# Patient Record
Sex: FEMALE | NOT HISPANIC OR LATINO | Employment: OTHER | URBAN - METROPOLITAN AREA
[De-identification: names, ages, dates, MRNs, and addresses within clinical notes are randomized per-mention and may not be internally consistent; named-entity substitution may affect disease eponyms.]

---

## 2017-11-30 ENCOUNTER — THERAPY VISIT (OUTPATIENT)
Dept: PHYSICAL THERAPY | Facility: CLINIC | Age: 62
End: 2017-11-30
Payer: COMMERCIAL

## 2017-11-30 DIAGNOSIS — M62.89 HIGH-TONE PELVIC FLOOR DYSFUNCTION: ICD-10-CM

## 2017-11-30 DIAGNOSIS — N39.3 URINARY, INCONTINENCE, STRESS FEMALE: Primary | ICD-10-CM

## 2017-11-30 PROCEDURE — 97140 MANUAL THERAPY 1/> REGIONS: CPT | Mod: GP | Performed by: PHYSICAL THERAPIST

## 2017-11-30 PROCEDURE — 97161 PT EVAL LOW COMPLEX 20 MIN: CPT | Mod: GP | Performed by: PHYSICAL THERAPIST

## 2017-11-30 PROCEDURE — 97530 THERAPEUTIC ACTIVITIES: CPT | Mod: GP | Performed by: PHYSICAL THERAPIST

## 2017-11-30 PROCEDURE — 97112 NEUROMUSCULAR REEDUCATION: CPT | Mod: GP | Performed by: PHYSICAL THERAPIST

## 2017-11-30 NOTE — LETTER
[facility]  [address]  [place] [address]  [phone]    2017    Re: Antoinette Collazo   :   1955  MRN:  [id number]   REFERRING PHYSICIAN:   Sarah Simpson    [facility]  Date of Initial Evaluation:  2017  Visits:  1 Rxs Used: 1  Reason for Referral:     Urinary, incontinence, stress female  High-tone pelvic floor dysfunction  EVALUATION SUMMARY  Subjective:  [facility] Initial Evaluation  History of current episode:  Onset date/MD order: .11/10/2017 Patient saw MD for orders for physical therapy     CC/Present symptoms: Pt presents to [facility] with c/c of urinary stress incontinence.  Patient states she has had incontinence for many years now and would like to abolish her symptoms.   Pain rating (0-10): 7/10  Pelvic floor pain with TP work  Conditioning is improving/unchanging/worsening: unchanging    Pelvic/Abdominal Surgeries: Pt has undergone a hysterectomy  and multiple abdominal laproscopies.  Pt also reports she had 2 tubal pregancies  Sexually active:NO  Hx of or present sexually transmitted disease:NO   Current occupation: none  Current activity: none  Goals: Abolish Incontinenc  Red flags: Patient is partially deaf  Urination:  Do you leak on the way to the bathroom or with a strong urge to void? Yes   Do you leak with cough,sneeze, jumping, running?Yes   Any other activities that cause leaking? No   Do you have triggers that make you feel you can't wait to go to the bathroom? No   Type of pad and number used per day? 1  When you leak what is the amount? Small  How long can you delay the need to urinate? 31 - 60 minutes.   Do you feel excessive pressure in pelvic floor No.    Frequency of daytime urination:every couple of hours  Frequency of nighttime urination:1  Can you stop the flow of urine when on the toilet? No  Is the volume of urine  "passed usually: medium. (8sec rule= 250ml with average bladder storing 400-600ml)  Do you strain to pass urine? No  Do you have a slow or hesitant urinary stream? No  Do you have difficulty initiating the urine stream? No  Is urination painful? No    Re: Antoinette Collazo   :   1955    How many bladder infections have you had in last 12 months? 1  Fluid intake(one glass is 8oz or one cup)  2-3 glasses/day, 4-6 glasses of Marcellus Ballard Ice tea caffinated glasses/day  0-1  alcohol glasses/day.  Bowel habits:  Frequency of bowel movements? 1 time a day  Consistancy of stool? Hard to diarrhea  Do you ignore the urge to defecate? No  Do you strain to pass stool? Yes  Pelvic Pain:  Do you have any pelvic pain with intercourse, exams, use of tampons? Yes  Are you sexually active?No  Have you ever been worried for your physical safety? No  Have you practiced the PF(kegel) exercises for 4 or more weeks? No  Treatment/Education provided this session (see flow sheet for additional information):  Self Care Management/Patient education (12 min): Today's session consisted of education regarding pelvic floor muscle anatomy, normal bladder function, urge suppression techniques and/or relaxation techniques as indicated, and instruction in how to complete a bladder diary for assessment next visit. Depending on patient presentation, timed voids, double voids, and proper fluid/fiber intake discussed. Pt was instructed in the pathoanatomy of the pelvic floor utilizing pelvic model.  We discussed what pelvic floor physical therapy is, components of exam, and typical patient progression. Prior to internal PFM exam,  patient was told that they were in control of exam progression, and if at any time were uncomfortable and wished to discontinue we would.    NMR (12 min): Patient instruction in correct isolation of PFM/TrA then coordinated contraction of \"canister\" muscles: diaphragm, Transverse Abdominals, PFM, and posterior spine " musculature.  Educated in initiating contraction from anal sphinctor, elevating through PFM, contraction of TrA, while exhaling slowly.  Cued for maximal and sub-maximal contractions, using hip rotators and adductors for overflow if needed.  Pt required cuing for each progression, with decreasing feedback as tolerated.  Instructed to avoid Valsalva/increased inter-abdominal pressure.  Pt cued through verbal, tactile (internal and external), and visual cuing utilizing biofeedback.  For HEP, pt encouraged to separate each phase of the exercise, then work towards coordination of the phases together with good breath technique with goal of developing good neuromuscular control of area.NMR pelvic pain (12 min):  Pt education regarding contributing factors to pelvic pain and dysfunction related to overactivity in the pelvic floor.  Included resources for relaxed awareness and pelvic floor quieting techniqes.  Extra time spent describing pelvic floor muscle exam and treatment plan/goals, with attention to potential history that may contribute to current symptoms.  Included resources for home release techniques using dilators and/or thera wand as indicated.  Recommended partner involvement if available.  Discussed in detail potential physiological and behavioral components of pelvic pain.  Demonstrated/performed techniques for input to painful area while using visualization and relaxation.    Pelvic Dysfunction Evaluation:    Bladder/Pelvic Problems:  Patient presents with USI symptoms.  Patient also reports being unable to stop passing gas if she has the need.  Patient reports that she feels bloated often and her belly is painful to touch.  Patient states that she has to strain to have a BM.  Storage Problem:  Stress incontinence and fecal incontinence  Emptying Problem:  Dyspareunia  Dyspareunia:  Grade 2    Re: Antoinette Collazo   :   1955    Flexibility:    Tightness present at:Iliopsoas; Hamstrings;  Piriformis and Gluteals  Internal vaginal assessment reveals tightness and MFR of levator ani, PC, puborectalis, and obturator internus muscles  Abdominal Wall:    Trigger Points:  Iliopsoas  Scar Mobility:  Abdominal scar tissue tightness noted of hysterectomy and laproscopy  Pelvic Clock Exam:    Ischiocavernosis pain:  +  Bulbocavernosis pain:  +  Transverse Perineal:  +  Levator ANI:  +++  Perineal Body:  ++  SI Provocation:  Si provocation pelvic: Dysfunction of LS and sacroiliac joint noted.  Reflex Testing:  NA  External Assessment:    Scars:  Well healed and tender  Tissue Symmetry:  Normal  Introitus:  Normal  Muscle Contraction/Perineal Mobility:  No movement  Internal Assessment:    Sensory Exam:  Abnormal for posterior and abnormal for anterior  Contraction/Grade:  Fllicker muscles stretched (1)  SEMG Biofeedback:  Semg biofeedback pelvic: Did not complete EMG evaluation because patient's resting level was 7uV and she was unable to lower with relaxation, change of positions or legs supported.  Did not want to increase pelvic tone anymore with muscle firing.  Suraface electrode placement--Perianal:  Surface perianal  Baseline EMG PM:  7.4uV  EMG interpretation to fatigue:  3-5 seconds  Position:  SupineAdditional History:  Delivery History:  Vaginal delivery  Number of Pregnancies: 3  Number of Live Births: 1  2 Tubal pregnancies  Caffeine Consumption:  4-6 ice tea     Assessment/Plan:    Patient is a 62 year old female with lumbar, sacral and pelvic complaints.    Patient has the following significant findings with corresponding treatment plan.                Diagnosis 1:  Urinary stress Incontinence  Diagnosis 2:  Pelvic floor hypertonicity  Therapy Evaluation Codes:   1) History comprised of:   Personal factors that impact the plan of care:      None.    Comorbidity factors that impact the plan of care are:      Bowel/bladder changes.     Medications impacting care: None.  2) Examination of Body  Systems comprised of:   Body structures and functions that impact the plan of care:      Lumbar spine, Pelvis and Sacral illiac joint.  Re: Antoinette Collazo   :   1955     Activity limitations that impact the plan of care are:      Jumping, Lifting, Fecal incontinence, Pelvic Exam and Stress incontinence.  3) Clinical presentation characteristics are:   Stable/Uncomplicated.  4) Decision-Making    Low complexity using standardized patient assessment instrument and/or measureable assessment of functional outcome.  Cumulative Therapy Evaluation is: Low complexity.  Previous and current functional limitations:  (See Goal Flow Sheet for this information)    Short term and Long term goals: (See Goal Flow Sheet for this information)   Communication ability:  Patient appears to be able to clearly communicate and understand verbal and written communication and follow directions correctly.  Treatment Explanation - The following has been discussed with the patient:   RX ordered/plan of care  Anticipated outcomes  Possible risks and side effects  This patient would benefit from PT intervention to resume normal activities.   Rehab potential is good.  Frequency:  1 X week, once daily  Duration:  for 8 weeks  Discharge Plan:  Achieve all LTG.  Independent in home treatment program.  Reach maximal therapeutic benefit.    Thank you for your referral.    INQUIRIES  Therapist: Tawanna Escobar    [facility]  [address] [place] [address] [address] [place] [id number]  Phone: [phone]  Fax: [phone]

## 2017-11-30 NOTE — MR AVS SNAPSHOT
"              After Visit Summary   11/30/2017    Antoinette Collazo    MRN: [id number]           Patient Information     Date Of Birth          1955        Visit Information        Provider Department      11/30/2017 11:55 AM Tawanna Escobar PT [place] [facility]        Today's Diagnoses     Urinary, incontinence, stress female    -  1    High-tone pelvic floor dysfunction           Follow-ups after your visit        Your next 10 appointments already scheduled     Dec 07, 2017 12:35 PM [facility]   [facility] with Tawanna Escobar PT   [place] [facility] ([facility]  )    [address]  [place] [address] [phone]              Who to contact     If you have questions or need follow up information about today's clinic visit or your schedule please contact [place] [facility]AP[facility] directly at [phone].  Normal or non-critical lab and imaging results will be communicated to you by Ovonyxhart, letter or phone within 4 business days after the clinic has received the results. If you do not hear from us within 7 days, please contact the clinic through Bitiumt or phone. If you have a critical or abnormal lab result, we will notify you by phone as soon as possible.  Submit refill requests through LaserLeap or call your pharmacy and they will forward the refill request to us. Please allow 3 business days for your refill to be completed.          Additional Information About Your Visit        [facility] Medical Information     LaserLeap lets you send messages to your doctor, view your test results, renew your prescriptions, schedule appointments and more. To sign up, go to [email or web address] . Click on \"Log in\" on the left side of the screen, which will take you to the Welcome page. Then click on \"Sign up Now\" on the right side of the page.     You will be asked to enter the access code " listed below, as well as some personal information. Please follow the directions to create your username and password.     Your access code is: [email or web address]  Expires: 2018  2:32 PM     Your access code will  in 90 days. If you need help or a new code, please call your [facility] clinic or [phone].        Care EveryWhere ID     This is your Care EveryWhere ID. This could be used by other organizations to access your [facility] medical records  [id number]         Blood Pressure from Last 3 Encounters:   09 138/64    Weight from Last 3 Encounters:   09 56.7 kg (125 lb)              We Performed the Following     HC PT EVAL, LOW COMPLEXITY     MANUAL THER TECH,1+REGIONS,EA 15 MIN     NEUROMUSCULAR RE-EDUCATION     THERAPEUTIC ACTIVITIES        Primary Care Provider Office Phone # Fax #    Zach Oliver [facility] [phone] [address] [address]        Equal Access to Services     [facility] : Hadii aad ku hadasho Soomaali, waaxda luqadaha, qaybta kaalmada adeegyada, waxay idiin hayaan adeeg lala lomeli . So [facility] [phone].    ATENCIÓN: Si habla español, tiene a modi disposición servicios gratuitos de asistencia lingüística. Llame al [phone].    We comply with applicable federal civil rights laws and [place] laws. We do not discriminate on the basis of race, color, national origin, age, disability, sex, sexual orientation, or gender identity.            Thank you!     Thank you for choosing [facility] PHYSICAL THERAP[facility]  for your care. Our goal is always to provide you with excellent care. Hearing back from our patients is one way we can continue to improve our services. Please take a few minutes to complete the written survey that you may receive in the mail after your visit with us. Thank you!             Your Updated Medication List - Protect others around you: Learn how to safely use, store and throw away your  medicines at www.disposemymeds.org.      Notice  As of 11/30/2017  2:32 PM    You have not been prescribed any medications.

## 2017-11-30 NOTE — PROGRESS NOTES
Subjective:  [place] [facility] Initial Evaluation    History of current episode:    Onset date/MD order: .11/10/2017 Patient saw MD for orders for physical therapy     CC/Present symptoms: Pt presents to [facility] with c/c of urinary stress incontinence.  Patient states she has had incontinence for many years now and would like to abolish her symptoms.   Pain rating (0-10): 7/10  Pelvic floor pain with TP work  Conditioning is improving/unchanging/worsening: unchanging    Pelvic/Abdominal Surgeries: Pt has undergone a hysterectomy 2002 and multiple abdominal laproscopies.  Pt also reports she had 2 tubal pregancies  Sexually active:NO  Hx of or present sexually transmitted disease:NO   Current occupation: none  Current activity: none  Goals: Abolish Incontinenc  Red flags: Patient is partially deaf      Urination:  Do you leak on the way to the bathroom or with a strong urge to void? Yes   Do you leak with cough,sneeze, jumping, running?Yes   Any other activities that cause leaking? No   Do you have triggers that make you feel you can't wait to go to the bathroom? No   Type of pad and number used per day? 1  When you leak what is the amount? Small  How long can you delay the need to urinate? 31 - 60 minutes.   Do you feel excessive pressure in pelvic floor No.    Frequency of daytime urination:every couple of hours  Frequency of nighttime urination:1  Can you stop the flow of urine when on the toilet? No  Is the volume of urine passed usually: medium. (8sec rule= 250ml with average bladder storing 400-600ml)  Do you strain to pass urine? No  Do you have a slow or hesitant urinary stream? No  Do you have difficulty initiating the urine stream? No  Is urination painful? No  How many bladder infections have you had in last 12 months? 1  Fluid intake(one glass is 8oz or one cup)  2-3 glasses/day, 4-6 glasses of Marcellus Ballard Ice tea caffinated glasses/day  0-1  alcohol glasses/day.  Bowel habits:  Frequency of  "bowel movements? 1 time a day  Consistancy of stool? Hard to diarrhea  Do you ignore the urge to defecate? No  Do you strain to pass stool? Yes  Pelvic Pain:  Do you have any pelvic pain with intercourse, exams, use of tampons? Yes  Are you sexually active?No  Have you ever been worried for your physical safety? No  Have you practiced the PF(kegel) exercises for 4 or more weeks? No    Treatment/Education provided this session (see flow sheet for additional information):    Self Care Management/Patient education (12 min): Today's session consisted of education regarding pelvic floor muscle anatomy, normal bladder function, urge suppression techniques and/or relaxation techniques as indicated, and instruction in how to complete a bladder diary for assessment next visit. Depending on patient presentation, timed voids, double voids, and proper fluid/fiber intake discussed. Pt was instructed in the pathoanatomy of the pelvic floor utilizing pelvic model.  We discussed what pelvic floor physical therapy is, components of exam, and typical patient progression. Prior to internal PFM exam,  patient was told that they were in control of exam progression, and if at any time were uncomfortable and wished to discontinue we would.        NMR (12 min): Patient instruction in correct isolation of PFM/TrA then coordinated contraction of \"canister\" muscles: diaphragm, Transverse Abdominals, PFM, and posterior spine musculature.  Educated in initiating contraction from anal sphinctor, elevating through PFM, contraction of TrA, while exhaling slowly.  Cued for maximal and sub-maximal contractions, using hip rotators and adductors for overflow if needed.  Pt required cuing for each progression, with decreasing feedback as tolerated.  Instructed to avoid Valsalva/increased inter-abdominal pressure.  Pt cued through verbal, tactile (internal and external), and visual cuing utilizing biofeedback.  For HEP, pt encouraged to separate each " phase of the exercise, then work towards coordination of the phases together with good breath technique with goal of developing good neuromuscular control of area.        NMR pelvic pain (12 min):  Pt education regarding contributing factors to pelvic pain and dysfunction related to overactivity in the pelvic floor.  Included resources for relaxed awareness and pelvic floor quieting techniqes.  Extra time spent describing pelvic floor muscle exam and treatment plan/goals, with attention to potential history that may contribute to current symptoms.  Included resources for home release techniques using dilators and/or thera wand as indicated.  Recommended partner involvement if available.  Discussed in detail potential physiological and behavioral components of pelvic pain.  Demonstrated/performed techniques for input to painful area while using visualization and relaxation.                  HPI                    Objective:    System                                 Pelvic Dysfunction Evaluation:    Bladder/Pelvic Problems:  Patient presents with USI symptoms.  Patient also reports being unable to stop passing gas if she has the need.  Patient reports that she feels bloated often and her belly is painful to touch.  Patient states that she has to strain to have a BM.  Storage Problem:  Stress incontinence and fecal incontinence  Emptying Problem:  Dyspareunia  Dyspareunia:  Grade 2      Flexibility:    Tightness present at:Iliopsoas; Hamstrings; Piriformis and Gluteals  Internal vaginal assessment reveals tightness and MFR of levator ani, PC, puborectalis, and obturator internus muscles  Abdominal Wall:      Trigger Points:  Iliopsoas  Scar Mobility:  Abdominal scar tissue tightness noted of hysterectomy and laproscopy  Pelvic Clock Exam:    Ischiocavernosis pain:  +  Bulbocavernosis pain:  +  Transverse Perineal:  +  Levator ANI:  +++  Perineal Body:  ++  SI Provocation:  Si provocation pelvic: Dysfunction of LS and  sacroiliac joint noted.        Reflex Testing:  NA    External Assessment:      Scars:  Well healed and tender    Tissue Symmetry:  Normal  Introitus:  Normal  Muscle Contraction/Perineal Mobility:  No movement  Internal Assessment:    Sensory Exam:  Abnormal for posterior and abnormal for anterior  Contraction/Grade:  Fllicker muscles stretched (1)          SEMG Biofeedback:  Semg biofeedback pelvic: Did not complete EMG evaluation because patient's resting level was 7uV and she was unable to lower with relaxation, change of positions or legs supported.  Did not want to increase pelvic tone anymore with muscle firing.      Suraface electrode placement--Perianal:  Surface perianal  Baseline EMG PM:  7.4uV        EMG interpretation to fatigue:  3-5 seconds  Position:  SupineAdditional History:  Delivery History:  Vaginal delivery  Number of Pregnancies: 3  Number of Live Births: 1  2 Tubal pregnancies  Caffeine Consumption:  4-6 ice tea                     General     ROS    Assessment/Plan:      Patient is a 62 year old female with lumbar, sacral and pelvic complaints.    Patient has the following significant findings with corresponding treatment plan.                Diagnosis 1:  Urinary stress Incontinence  Diagnosis 2:  Pelvic floor hypertonicity    Therapy Evaluation Codes:   1) History comprised of:   Personal factors that impact the plan of care:      None.    Comorbidity factors that impact the plan of care are:      Bowel/bladder changes.     Medications impacting care: None.  2) Examination of Body Systems comprised of:   Body structures and functions that impact the plan of care:      Lumbar spine, Pelvis and Sacral illiac joint.   Activity limitations that impact the plan of care are:      Jumping, Lifting, Fecal incontinence, Pelvic Exam and Stress incontinence.  3) Clinical presentation characteristics are:   Stable/Uncomplicated.  4) Decision-Making    Low complexity using standardized patient  assessment instrument and/or measureable assessment of functional outcome.  Cumulative Therapy Evaluation is: Low complexity.    Previous and current functional limitations:  (See Goal Flow Sheet for this information)    Short term and Long term goals: (See Goal Flow Sheet for this information)     Communication ability:  Patient appears to be able to clearly communicate and understand verbal and written communication and follow directions correctly.  Treatment Explanation - The following has been discussed with the patient:   RX ordered/plan of care  Anticipated outcomes  Possible risks and side effects  This patient would benefit from PT intervention to resume normal activities.   Rehab potential is good.    Frequency:  1 X week, once daily  Duration:  for 8 weeks  Discharge Plan:  Achieve all LTG.  Independent in home treatment program.  Reach maximal therapeutic benefit.    Please refer to the daily flowsheet for treatment today, total treatment time and time spent performing 1:1 timed codes.

## 2017-11-30 NOTE — PROGRESS NOTES
Subjective:    Patient is a 62 year old female presenting with rehab left ankle/foot hpi.                                      Pertinent medical history includes:  High blood pressure.    Other surgeries include:  None reported.  Current medications:  None as reported by patient.  Current occupation is none.        Barriers include:  None as reported by patient.    Red flags:  None as reported by patient.                        Objective:    System    Physical Exam    General     ROS    Assessment/Plan:

## 2017-12-07 ENCOUNTER — THERAPY VISIT (OUTPATIENT)
Dept: PHYSICAL THERAPY | Facility: CLINIC | Age: 62
End: 2017-12-07
Payer: COMMERCIAL

## 2017-12-07 DIAGNOSIS — N94.10 DYSPAREUNIA, FEMALE: ICD-10-CM

## 2017-12-07 DIAGNOSIS — N39.46 MIXED STRESS AND URGE URINARY INCONTINENCE: Primary | ICD-10-CM

## 2017-12-07 PROCEDURE — 97112 NEUROMUSCULAR REEDUCATION: CPT | Mod: GP | Performed by: PHYSICAL THERAPIST

## 2017-12-07 PROCEDURE — 97530 THERAPEUTIC ACTIVITIES: CPT | Mod: GP | Performed by: PHYSICAL THERAPIST

## 2017-12-07 PROCEDURE — 97140 MANUAL THERAPY 1/> REGIONS: CPT | Mod: GP | Performed by: PHYSICAL THERAPIST

## 2017-12-14 ENCOUNTER — THERAPY VISIT (OUTPATIENT)
Dept: PHYSICAL THERAPY | Facility: CLINIC | Age: 62
End: 2017-12-14
Payer: COMMERCIAL

## 2017-12-14 DIAGNOSIS — N39.46 MIXED STRESS AND URGE URINARY INCONTINENCE: Primary | ICD-10-CM

## 2017-12-14 DIAGNOSIS — N94.10 DYSPAREUNIA, FEMALE: ICD-10-CM

## 2017-12-14 PROCEDURE — 97530 THERAPEUTIC ACTIVITIES: CPT | Mod: GP | Performed by: PHYSICAL THERAPIST

## 2017-12-14 PROCEDURE — 97140 MANUAL THERAPY 1/> REGIONS: CPT | Mod: GP | Performed by: PHYSICAL THERAPIST

## 2017-12-21 ENCOUNTER — THERAPY VISIT (OUTPATIENT)
Dept: PHYSICAL THERAPY | Facility: CLINIC | Age: 62
End: 2017-12-21
Payer: COMMERCIAL

## 2017-12-21 DIAGNOSIS — N39.46 MIXED STRESS AND URGE URINARY INCONTINENCE: Primary | ICD-10-CM

## 2017-12-21 DIAGNOSIS — N94.10 DYSPAREUNIA, FEMALE: ICD-10-CM

## 2017-12-21 PROCEDURE — 97530 THERAPEUTIC ACTIVITIES: CPT | Mod: GP | Performed by: PHYSICAL THERAPIST

## 2017-12-21 PROCEDURE — 97112 NEUROMUSCULAR REEDUCATION: CPT | Mod: GP | Performed by: PHYSICAL THERAPIST

## 2017-12-21 PROCEDURE — 97140 MANUAL THERAPY 1/> REGIONS: CPT | Mod: GP | Performed by: PHYSICAL THERAPIST

## 2018-01-04 ENCOUNTER — THERAPY VISIT (OUTPATIENT)
Dept: PHYSICAL THERAPY | Facility: CLINIC | Age: 63
End: 2018-01-04
Payer: COMMERCIAL

## 2018-01-04 DIAGNOSIS — N94.10 DYSPAREUNIA, FEMALE: ICD-10-CM

## 2018-01-04 DIAGNOSIS — N39.46 MIXED STRESS AND URGE URINARY INCONTINENCE: Primary | ICD-10-CM

## 2018-01-04 PROCEDURE — 97140 MANUAL THERAPY 1/> REGIONS: CPT | Mod: GP | Performed by: PHYSICAL THERAPIST

## 2018-01-04 PROCEDURE — 97112 NEUROMUSCULAR REEDUCATION: CPT | Mod: GP | Performed by: PHYSICAL THERAPIST

## 2018-01-04 PROCEDURE — 97530 THERAPEUTIC ACTIVITIES: CPT | Mod: GP | Performed by: PHYSICAL THERAPIST

## 2018-01-18 ENCOUNTER — THERAPY VISIT (OUTPATIENT)
Dept: PHYSICAL THERAPY | Facility: CLINIC | Age: 63
End: 2018-01-18
Payer: COMMERCIAL

## 2018-01-18 DIAGNOSIS — N94.10 DYSPAREUNIA, FEMALE: ICD-10-CM

## 2018-01-18 DIAGNOSIS — N39.46 MIXED STRESS AND URGE URINARY INCONTINENCE: Primary | ICD-10-CM

## 2018-01-18 PROCEDURE — 97112 NEUROMUSCULAR REEDUCATION: CPT | Mod: GP | Performed by: PHYSICAL THERAPIST

## 2018-01-18 PROCEDURE — 97140 MANUAL THERAPY 1/> REGIONS: CPT | Mod: GP | Performed by: PHYSICAL THERAPIST

## 2018-01-18 PROCEDURE — 97530 THERAPEUTIC ACTIVITIES: CPT | Mod: GP | Performed by: PHYSICAL THERAPIST

## 2018-02-08 ENCOUNTER — THERAPY VISIT (OUTPATIENT)
Dept: PHYSICAL THERAPY | Facility: CLINIC | Age: 63
End: 2018-02-08
Payer: COMMERCIAL

## 2018-02-08 DIAGNOSIS — N39.46 MIXED STRESS AND URGE URINARY INCONTINENCE: ICD-10-CM

## 2018-02-08 DIAGNOSIS — N94.10 DYSPAREUNIA, FEMALE: Primary | ICD-10-CM

## 2018-02-08 PROCEDURE — 97530 THERAPEUTIC ACTIVITIES: CPT | Mod: GP | Performed by: PHYSICAL THERAPIST

## 2018-02-08 PROCEDURE — 97112 NEUROMUSCULAR REEDUCATION: CPT | Mod: GP | Performed by: PHYSICAL THERAPIST

## 2018-02-08 PROCEDURE — 97140 MANUAL THERAPY 1/> REGIONS: CPT | Mod: GP | Performed by: PHYSICAL THERAPIST

## 2018-03-01 ENCOUNTER — THERAPY VISIT (OUTPATIENT)
Dept: PHYSICAL THERAPY | Facility: CLINIC | Age: 63
End: 2018-03-01
Payer: COMMERCIAL

## 2018-03-01 DIAGNOSIS — N94.10 DYSPAREUNIA, FEMALE: ICD-10-CM

## 2018-03-01 DIAGNOSIS — N39.46 MIXED STRESS AND URGE URINARY INCONTINENCE: Primary | ICD-10-CM

## 2018-03-01 PROCEDURE — 97112 NEUROMUSCULAR REEDUCATION: CPT | Mod: GP | Performed by: PHYSICAL THERAPIST

## 2018-03-01 PROCEDURE — 97140 MANUAL THERAPY 1/> REGIONS: CPT | Mod: GP | Performed by: PHYSICAL THERAPIST

## 2018-03-01 PROCEDURE — 97530 THERAPEUTIC ACTIVITIES: CPT | Mod: GP | Performed by: PHYSICAL THERAPIST

## 2018-03-29 ENCOUNTER — THERAPY VISIT (OUTPATIENT)
Dept: PHYSICAL THERAPY | Facility: CLINIC | Age: 63
End: 2018-03-29
Payer: COMMERCIAL

## 2018-03-29 DIAGNOSIS — N94.10 DYSPAREUNIA, FEMALE: ICD-10-CM

## 2018-03-29 DIAGNOSIS — N39.46 MIXED STRESS AND URGE URINARY INCONTINENCE: Primary | ICD-10-CM

## 2018-03-29 PROCEDURE — 97140 MANUAL THERAPY 1/> REGIONS: CPT | Mod: GP | Performed by: PHYSICAL THERAPIST

## 2018-03-29 PROCEDURE — 97112 NEUROMUSCULAR REEDUCATION: CPT | Mod: GP | Performed by: PHYSICAL THERAPIST

## 2018-03-29 PROCEDURE — 97530 THERAPEUTIC ACTIVITIES: CPT | Mod: GP | Performed by: PHYSICAL THERAPIST

## 2018-04-05 ENCOUNTER — THERAPY VISIT (OUTPATIENT)
Dept: PHYSICAL THERAPY | Facility: CLINIC | Age: 63
End: 2018-04-05
Payer: COMMERCIAL

## 2018-04-05 DIAGNOSIS — N39.46 MIXED STRESS AND URGE URINARY INCONTINENCE: Primary | ICD-10-CM

## 2018-04-05 DIAGNOSIS — N94.10 DYSPAREUNIA, FEMALE: ICD-10-CM

## 2018-04-05 PROCEDURE — 97110 THERAPEUTIC EXERCISES: CPT | Mod: GP | Performed by: PHYSICAL THERAPIST

## 2018-04-05 PROCEDURE — 97140 MANUAL THERAPY 1/> REGIONS: CPT | Mod: GP | Performed by: PHYSICAL THERAPIST

## 2018-04-05 PROCEDURE — 97112 NEUROMUSCULAR REEDUCATION: CPT | Mod: GP | Performed by: PHYSICAL THERAPIST

## 2018-04-19 ENCOUNTER — THERAPY VISIT (OUTPATIENT)
Dept: PHYSICAL THERAPY | Facility: CLINIC | Age: 63
End: 2018-04-19
Payer: COMMERCIAL

## 2018-04-19 DIAGNOSIS — N39.46 MIXED STRESS AND URGE URINARY INCONTINENCE: Primary | ICD-10-CM

## 2018-04-19 DIAGNOSIS — N94.10 DYSPAREUNIA, FEMALE: ICD-10-CM

## 2018-04-19 PROCEDURE — 97110 THERAPEUTIC EXERCISES: CPT | Mod: GP | Performed by: PHYSICAL THERAPIST

## 2018-04-19 PROCEDURE — 97112 NEUROMUSCULAR REEDUCATION: CPT | Mod: GP | Performed by: PHYSICAL THERAPIST

## 2018-04-19 PROCEDURE — 97140 MANUAL THERAPY 1/> REGIONS: CPT | Mod: GP | Performed by: PHYSICAL THERAPIST

## 2018-07-24 ENCOUNTER — HOSPITAL ENCOUNTER (OUTPATIENT)
Dept: MAMMOGRAPHY | Facility: CLINIC | Age: 63
Discharge: HOME | End: 2018-07-24
Attending: FAMILY MEDICINE | Admitting: FAMILY MEDICINE
Payer: COMMERCIAL

## 2018-07-24 DIAGNOSIS — Z12.31 VISIT FOR SCREENING MAMMOGRAM: ICD-10-CM

## 2018-07-24 PROCEDURE — 77067 SCR MAMMO BI INCL CAD: CPT

## 2019-12-28 ENCOUNTER — APPOINTMENT (OUTPATIENT)
Dept: GENERAL RADIOLOGY | Facility: CLINIC | Age: 64
End: 2019-12-28
Attending: EMERGENCY MEDICINE
Payer: COMMERCIAL

## 2019-12-28 ENCOUNTER — HOSPITAL ENCOUNTER (INPATIENT)
Facility: CLINIC | Age: 64
LOS: 5 days | Discharge: HOME HEALTH | End: 2020-01-02
Attending: EMERGENCY MEDICINE | Admitting: HOSPITALIST
Payer: COMMERCIAL

## 2019-12-28 DIAGNOSIS — S72.001A HIP FRACTURE, RIGHT, CLOSED, INITIAL ENCOUNTER (H): ICD-10-CM

## 2019-12-28 LAB
ANION GAP SERPL CALCULATED.3IONS-SCNC: 4 MMOL/L (ref 3–14)
BASOPHILS # BLD AUTO: 0 10E9/L (ref 0–0.2)
BASOPHILS NFR BLD AUTO: 0.3 %
BUN SERPL-MCNC: 11 MG/DL (ref 7–30)
CALCIUM SERPL-MCNC: 9 MG/DL (ref 8.5–10.1)
CHLORIDE SERPL-SCNC: 107 MMOL/L (ref 94–109)
CO2 SERPL-SCNC: 30 MMOL/L (ref 20–32)
CREAT SERPL-MCNC: 0.64 MG/DL (ref 0.52–1.04)
DIFFERENTIAL METHOD BLD: NORMAL
EOSINOPHIL # BLD AUTO: 0.1 10E9/L (ref 0–0.7)
EOSINOPHIL NFR BLD AUTO: 0.5 %
ERYTHROCYTE [DISTWIDTH] IN BLOOD BY AUTOMATED COUNT: 12.7 % (ref 10–15)
GFR SERPL CREATININE-BSD FRML MDRD: >90 ML/MIN/{1.73_M2}
GLUCOSE SERPL-MCNC: 124 MG/DL (ref 70–99)
HCT VFR BLD AUTO: 42.3 % (ref 35–47)
HGB BLD-MCNC: 14.2 G/DL (ref 11.7–15.7)
IMM GRANULOCYTES # BLD: 0 10E9/L (ref 0–0.4)
IMM GRANULOCYTES NFR BLD: 0.4 %
LYMPHOCYTES # BLD AUTO: 2.5 10E9/L (ref 0.8–5.3)
LYMPHOCYTES NFR BLD AUTO: 22.7 %
MCH RBC QN AUTO: 30.9 PG (ref 26.5–33)
MCHC RBC AUTO-ENTMCNC: 33.6 G/DL (ref 31.5–36.5)
MCV RBC AUTO: 92 FL (ref 78–100)
MONOCYTES # BLD AUTO: 0.6 10E9/L (ref 0–1.3)
MONOCYTES NFR BLD AUTO: 5.8 %
NEUTROPHILS # BLD AUTO: 7.7 10E9/L (ref 1.6–8.3)
NEUTROPHILS NFR BLD AUTO: 70.3 %
NRBC # BLD AUTO: 0 10*3/UL
NRBC BLD AUTO-RTO: 0 /100
PLATELET # BLD AUTO: 293 10E9/L (ref 150–450)
POTASSIUM SERPL-SCNC: 3.8 MMOL/L (ref 3.4–5.3)
RBC # BLD AUTO: 4.59 10E12/L (ref 3.8–5.2)
SODIUM SERPL-SCNC: 141 MMOL/L (ref 133–144)
WBC # BLD AUTO: 10.9 10E9/L (ref 4–11)

## 2019-12-28 PROCEDURE — 25000128 H RX IP 250 OP 636: Performed by: EMERGENCY MEDICINE

## 2019-12-28 PROCEDURE — 96376 TX/PRO/DX INJ SAME DRUG ADON: CPT

## 2019-12-28 PROCEDURE — 96374 THER/PROPH/DIAG INJ IV PUSH: CPT

## 2019-12-28 PROCEDURE — 99222 1ST HOSP IP/OBS MODERATE 55: CPT | Mod: AI | Performed by: HOSPITALIST

## 2019-12-28 PROCEDURE — 73502 X-RAY EXAM HIP UNI 2-3 VIEWS: CPT

## 2019-12-28 PROCEDURE — 80048 BASIC METABOLIC PNL TOTAL CA: CPT | Performed by: EMERGENCY MEDICINE

## 2019-12-28 PROCEDURE — 25800030 ZZH RX IP 258 OP 636: Performed by: HOSPITALIST

## 2019-12-28 PROCEDURE — 99285 EMERGENCY DEPT VISIT HI MDM: CPT | Mod: 25

## 2019-12-28 PROCEDURE — 12000000 ZZH R&B MED SURG/OB

## 2019-12-28 PROCEDURE — 96361 HYDRATE IV INFUSION ADD-ON: CPT

## 2019-12-28 PROCEDURE — 85025 COMPLETE CBC W/AUTO DIFF WBC: CPT | Performed by: EMERGENCY MEDICINE

## 2019-12-28 PROCEDURE — 71045 X-RAY EXAM CHEST 1 VIEW: CPT

## 2019-12-28 PROCEDURE — 25000132 ZZH RX MED GY IP 250 OP 250 PS 637: Performed by: HOSPITALIST

## 2019-12-28 PROCEDURE — 25000128 H RX IP 250 OP 636: Performed by: HOSPITALIST

## 2019-12-28 PROCEDURE — 25800030 ZZH RX IP 258 OP 636: Performed by: EMERGENCY MEDICINE

## 2019-12-28 RX ORDER — AMOXICILLIN 250 MG
2 CAPSULE ORAL 2 TIMES DAILY PRN
Status: DISCONTINUED | OUTPATIENT
Start: 2019-12-28 | End: 2020-01-02 | Stop reason: HOSPADM

## 2019-12-28 RX ORDER — PROCHLORPERAZINE MALEATE 5 MG
10 TABLET ORAL EVERY 6 HOURS PRN
Status: DISCONTINUED | OUTPATIENT
Start: 2019-12-28 | End: 2020-01-02 | Stop reason: HOSPADM

## 2019-12-28 RX ORDER — PROCHLORPERAZINE 25 MG
25 SUPPOSITORY, RECTAL RECTAL EVERY 12 HOURS PRN
Status: DISCONTINUED | OUTPATIENT
Start: 2019-12-28 | End: 2020-01-02 | Stop reason: HOSPADM

## 2019-12-28 RX ORDER — BISACODYL 10 MG
10 SUPPOSITORY, RECTAL RECTAL DAILY PRN
Status: DISCONTINUED | OUTPATIENT
Start: 2019-12-28 | End: 2019-12-29

## 2019-12-28 RX ORDER — HYDROMORPHONE HYDROCHLORIDE 1 MG/ML
0.5 INJECTION, SOLUTION INTRAMUSCULAR; INTRAVENOUS; SUBCUTANEOUS
Status: COMPLETED | OUTPATIENT
Start: 2019-12-28 | End: 2019-12-28

## 2019-12-28 RX ORDER — HYDROMORPHONE HYDROCHLORIDE 1 MG/ML
.3-.5 INJECTION, SOLUTION INTRAMUSCULAR; INTRAVENOUS; SUBCUTANEOUS
Status: DISCONTINUED | OUTPATIENT
Start: 2019-12-28 | End: 2020-01-02 | Stop reason: HOSPADM

## 2019-12-28 RX ORDER — HYDROCODONE BITARTRATE AND ACETAMINOPHEN 5; 325 MG/1; MG/1
1-2 TABLET ORAL EVERY 4 HOURS PRN
Status: DISCONTINUED | OUTPATIENT
Start: 2019-12-28 | End: 2019-12-29 | Stop reason: ALTCHOICE

## 2019-12-28 RX ORDER — ONDANSETRON 4 MG/1
4 TABLET, ORALLY DISINTEGRATING ORAL EVERY 6 HOURS PRN
Status: DISCONTINUED | OUTPATIENT
Start: 2019-12-28 | End: 2019-12-29

## 2019-12-28 RX ORDER — AMOXICILLIN 250 MG
1 CAPSULE ORAL 2 TIMES DAILY PRN
Status: DISCONTINUED | OUTPATIENT
Start: 2019-12-28 | End: 2020-01-02 | Stop reason: HOSPADM

## 2019-12-28 RX ORDER — SODIUM CHLORIDE 9 MG/ML
1000 INJECTION, SOLUTION INTRAVENOUS CONTINUOUS
Status: DISCONTINUED | OUTPATIENT
Start: 2019-12-28 | End: 2019-12-28

## 2019-12-28 RX ORDER — NALOXONE HYDROCHLORIDE 0.4 MG/ML
.1-.4 INJECTION, SOLUTION INTRAMUSCULAR; INTRAVENOUS; SUBCUTANEOUS
Status: DISCONTINUED | OUTPATIENT
Start: 2019-12-28 | End: 2020-01-02 | Stop reason: HOSPADM

## 2019-12-28 RX ORDER — LATANOPROST 50 UG/ML
1 SOLUTION/ DROPS OPHTHALMIC DAILY
COMMUNITY

## 2019-12-28 RX ORDER — SODIUM CHLORIDE 9 MG/ML
INJECTION, SOLUTION INTRAVENOUS CONTINUOUS
Status: DISCONTINUED | OUTPATIENT
Start: 2019-12-28 | End: 2019-12-29

## 2019-12-28 RX ORDER — ONDANSETRON 2 MG/ML
4 INJECTION INTRAMUSCULAR; INTRAVENOUS EVERY 6 HOURS PRN
Status: DISCONTINUED | OUTPATIENT
Start: 2019-12-28 | End: 2019-12-29

## 2019-12-28 RX ORDER — LIDOCAINE 40 MG/G
CREAM TOPICAL
Status: DISCONTINUED | OUTPATIENT
Start: 2019-12-28 | End: 2019-12-29

## 2019-12-28 RX ORDER — ACETAMINOPHEN 325 MG/1
650 TABLET ORAL EVERY 4 HOURS PRN
Status: DISCONTINUED | OUTPATIENT
Start: 2019-12-28 | End: 2019-12-29

## 2019-12-28 RX ADMIN — HYDROMORPHONE HYDROCHLORIDE 0.5 MG: 1 INJECTION, SOLUTION INTRAMUSCULAR; INTRAVENOUS; SUBCUTANEOUS at 18:26

## 2019-12-28 RX ADMIN — HYDROMORPHONE HYDROCHLORIDE 0.5 MG: 1 INJECTION, SOLUTION INTRAMUSCULAR; INTRAVENOUS; SUBCUTANEOUS at 20:57

## 2019-12-28 RX ADMIN — ACETAMINOPHEN 650 MG: 325 TABLET, FILM COATED ORAL at 23:45

## 2019-12-28 RX ADMIN — ONDANSETRON 4 MG: 2 INJECTION INTRAMUSCULAR; INTRAVENOUS at 20:57

## 2019-12-28 RX ADMIN — SODIUM CHLORIDE 1000 ML: 9 INJECTION, SOLUTION INTRAVENOUS at 18:27

## 2019-12-28 RX ADMIN — SODIUM CHLORIDE: 9 INJECTION, SOLUTION INTRAVENOUS at 20:55

## 2019-12-28 RX ADMIN — HYDROMORPHONE HYDROCHLORIDE 0.5 MG: 1 INJECTION, SOLUTION INTRAMUSCULAR; INTRAVENOUS; SUBCUTANEOUS at 23:28

## 2019-12-28 RX ADMIN — HYDROMORPHONE HYDROCHLORIDE 0.5 MG: 1 INJECTION, SOLUTION INTRAMUSCULAR; INTRAVENOUS; SUBCUTANEOUS at 19:40

## 2019-12-28 NOTE — ED PROVIDER NOTES
History     Chief Complaint:  Fall    HPI   Antoinette Collazo is a 64 year old female who presents by EMS after a fall. The patient was out walking her dog today when she slipped on the ice and fell, landing on her right and causing immediate severe nonradiating right hip pain. She denied hitting her head, losing consciousness or walking after falling. EMS was called to bring the patient to the ED with concern for hip fracture. En route she was given 1.5 mg of Dilaudid and 4 mg of Zofran. The patient reported that at baseline she can ambulate on her own. Of note the patient stated that her good ear is her right ear due to chronic hearing impairment.    She is not on blood thinners.  She denies concern for any injuries besides her right hip.  She worries that she has broken her right hip.    Allergies:  Amoxicillin   Ibuprofen      Medications:    The patient is currently on no regular medications.     Past Medical History:    The patient denies any significant past medical history.    Past Surgical History:    Hysterectomy     Family History:    No past pertinent family history.    Social History:  Smoking Status: Not on file   Alcohol use: Not on file  Drug use: Not on file  Marital Status:   [2]     Review of Systems   All other systems reviewed and are negative.    Physical Exam     Patient Vitals for the past 24 hrs:   BP Temp Temp src Pulse Heart Rate Resp SpO2   12/28/19 1830 130/67 -- -- 69 -- -- 98 %   12/28/19 1817 -- -- -- -- -- -- 96 %   12/28/19 1815 134/72 -- -- 74 -- -- --   12/28/19 1748 -- -- -- -- -- -- 93 %   12/28/19 1745 (!) 87/56 98  F (36.7  C) Oral 81 72 18 93 %     Physical Exam  General: Uncomfortable appearing woman recumbent in room 13,  at bedside  HENT: MMM, no signs of facial trauma  CV:  regular rhythm, soft compartments, cap refill normal  Resp: normal effort, clear throughout  GI: abdomen soft,  nontender  MSK:  Spine: nontender  Extremities: Tenderness to right  hip with slight external rotation and shortening of the right leg compared to the left.  Greatly decreased range of motion of right hip due to pain.  No other tenderness of the extremities.  Skin:   No abrasion  No ecchymosis  No laceration  Neuro: awake, alert, GCS 15, responds appropriately to commands, SILT all extremities  Psych: cooperative    Emergency Department Course   Imaging:  Radiographic findings were communicated with the patient who voiced understanding of the findings.    XR Chest 1 views:   Normal chest. Clear lungs As per radiology.     XR Pelvis and Hip Right, 1 view:   1.  Acute, simple, transversely oriented fracture of the right femoral  neck, mildly displaced/angulated.  2.  Normal right hip joint alignment maintained. Maintained joint  space.  3.  The remainder of the pelvis and left hip are normal, as per radiology.     Laboratory:  CBC: WBC: 10.9, HGB: 14.2, PLT: 293  BMP: Glucose 124 (H), o/w WNL (Creatinine: 0.64)    Interventions:  1926 Dilaudid 0.5 mg IV   1827 NS 1L IV     Emergency Department Course:  Nursing notes and vitals reviewed. (1746) I performed an exam of the patient as documented above.     IV inserted. Medicine administered as documented above. Blood drawn. This was sent to the lab for further testing, results above.    The patient was sent for chest and hip XR while in the emergency department, findings above.     (1850) I rechecked the patient and discussed the results of her workup thus far.     (1857) I consulted with Dr. Brito, Orthopedics, regarding the patient's history and presentation here in the emergency department.  He will tentatively plan surgical intervention tomorrow, and requests admission to the hospitalist based on patient age, for pre-operative assessment.    (1902)  I consulted with Dr. Pisano of the hospitalist services. They are in agreement to accept the patient for admission.    Findings and plan explained to the Patient who consents to  admission. Discussed the patient with Dr. Pisano, who will admit the patient to a Ortho-inpatient bed for further monitoring, evaluation, and treatment.    Impression & Plan    Medical Decision Making:  Her mechanical fall has unfortunately led to a hip fracture as described above.  She will require surgical intervention during this hospitalization.  I spoke with the on-call orthopedist who will tentatively plan to perform this tomorrow.  She will be kept n.p.o. after midnight.  No other identified injuries, nor suspicion for such based on mechanism and lack of other symptoms or worrisome findings on exam.  Her initial hypotension I think is almost certainly due to a fairly significant dose of IV Dilaudid given just prior to arrival, as it improved quickly without aggressive intervention.  I do not think this is representative of serious internal hemorrhage or other worrisome traumatic injury nor that trauma surgery consultation is indicated emergently.  I note that her hemoglobin is satisfactory and her admission vital signs are normal.  Tertiary exam can be completed here in the hospital.  These findings and tentative plan were explained to the patient who was admitted to the hospital service for further multidisciplinary care.    Diagnosis:    ICD-10-CM    1. Hip fracture, right, closed, initial encounter (H) S72.001A CBC with platelets differential     Basic metabolic panel       Disposition:  Admitted to Dr. Pisano    This record was created at least in part using electronic voice recognition software, so please excuse any typographical errors.    Scribe Disclosure:  I, Daniella Brambila, am serving as a scribe on 12/28/2019 at 5:46 PM to personally document services performed by Pepe Sood, * based on my observations and the provider's statements to me.       Daniella Brambila  12/28/2019    EMERGENCY DEPARTMENT       Pepe Sood MD  12/28/19 8180

## 2019-12-28 NOTE — ED NOTES
Bed: ED13  Expected date:   Expected time:   Means of arrival:   Comments:  [facility] [address] [id number] possible right hip fx - ETA 4min

## 2019-12-29 ENCOUNTER — ANESTHESIA (OUTPATIENT)
Dept: SURGERY | Facility: CLINIC | Age: 64
End: 2019-12-29
Payer: COMMERCIAL

## 2019-12-29 ENCOUNTER — APPOINTMENT (OUTPATIENT)
Dept: GENERAL RADIOLOGY | Facility: CLINIC | Age: 64
End: 2019-12-29
Attending: PHYSICIAN ASSISTANT
Payer: COMMERCIAL

## 2019-12-29 ENCOUNTER — ANESTHESIA EVENT (OUTPATIENT)
Dept: SURGERY | Facility: CLINIC | Age: 64
End: 2019-12-29
Payer: COMMERCIAL

## 2019-12-29 LAB
ABO + RH BLD: NORMAL
ABO + RH BLD: NORMAL
BLD GP AB SCN SERPL QL: NORMAL
BLOOD BANK CMNT PATIENT-IMP: NORMAL
CREAT SERPL-MCNC: 0.54 MG/DL (ref 0.52–1.04)
GFR SERPL CREATININE-BSD FRML MDRD: >90 ML/MIN/{1.73_M2}
HGB BLD-MCNC: 13.4 G/DL (ref 11.7–15.7)
PLATELET # BLD AUTO: 245 10E9/L (ref 150–450)
SPECIMEN EXP DATE BLD: NORMAL

## 2019-12-29 PROCEDURE — 36000093 ZZH SURGERY LEVEL 4 1ST 30 MIN: Performed by: ORTHOPAEDIC SURGERY

## 2019-12-29 PROCEDURE — 25800030 ZZH RX IP 258 OP 636: Performed by: ANESTHESIOLOGY

## 2019-12-29 PROCEDURE — 85049 AUTOMATED PLATELET COUNT: CPT | Performed by: HOSPITALIST

## 2019-12-29 PROCEDURE — 99232 SBSQ HOSP IP/OBS MODERATE 35: CPT | Performed by: HOSPITALIST

## 2019-12-29 PROCEDURE — 25000132 ZZH RX MED GY IP 250 OP 250 PS 637: Performed by: HOSPITALIST

## 2019-12-29 PROCEDURE — 37000009 ZZH ANESTHESIA TECHNICAL FEE, EACH ADDTL 15 MIN: Performed by: ORTHOPAEDIC SURGERY

## 2019-12-29 PROCEDURE — 85018 HEMOGLOBIN: CPT | Performed by: HOSPITALIST

## 2019-12-29 PROCEDURE — 86900 BLOOD TYPING SEROLOGIC ABO: CPT | Performed by: ANESTHESIOLOGY

## 2019-12-29 PROCEDURE — 82565 ASSAY OF CREATININE: CPT | Performed by: HOSPITALIST

## 2019-12-29 PROCEDURE — 36415 COLL VENOUS BLD VENIPUNCTURE: CPT | Performed by: HOSPITALIST

## 2019-12-29 PROCEDURE — 25800030 ZZH RX IP 258 OP 636: Performed by: ORTHOPAEDIC SURGERY

## 2019-12-29 PROCEDURE — C1713 ANCHOR/SCREW BN/BN,TIS/BN: HCPCS | Performed by: ORTHOPAEDIC SURGERY

## 2019-12-29 PROCEDURE — 25000128 H RX IP 250 OP 636: Performed by: ORTHOPAEDIC SURGERY

## 2019-12-29 PROCEDURE — 71000013 ZZH RECOVERY PHASE 1 LEVEL 1 EA ADDTL HR: Performed by: ORTHOPAEDIC SURGERY

## 2019-12-29 PROCEDURE — 86850 RBC ANTIBODY SCREEN: CPT | Performed by: ANESTHESIOLOGY

## 2019-12-29 PROCEDURE — 27210794 ZZH OR GENERAL SUPPLY STERILE: Performed by: ORTHOPAEDIC SURGERY

## 2019-12-29 PROCEDURE — 37000008 ZZH ANESTHESIA TECHNICAL FEE, 1ST 30 MIN: Performed by: ORTHOPAEDIC SURGERY

## 2019-12-29 PROCEDURE — 25000125 ZZHC RX 250: Performed by: ORTHOPAEDIC SURGERY

## 2019-12-29 PROCEDURE — 86901 BLOOD TYPING SEROLOGIC RH(D): CPT | Performed by: ANESTHESIOLOGY

## 2019-12-29 PROCEDURE — 25000125 ZZHC RX 250: Performed by: PHYSICIAN ASSISTANT

## 2019-12-29 PROCEDURE — 25000125 ZZHC RX 250: Performed by: NURSE ANESTHETIST, CERTIFIED REGISTERED

## 2019-12-29 PROCEDURE — 25000132 ZZH RX MED GY IP 250 OP 250 PS 637: Performed by: PHYSICIAN ASSISTANT

## 2019-12-29 PROCEDURE — 40000985 XR PELVIS AD HIP PORTABLE RIGHT 1 VIEW

## 2019-12-29 PROCEDURE — 25000128 H RX IP 250 OP 636: Performed by: NURSE ANESTHETIST, CERTIFIED REGISTERED

## 2019-12-29 PROCEDURE — 36000063 ZZH SURGERY LEVEL 4 EA 15 ADDTL MIN: Performed by: ORTHOPAEDIC SURGERY

## 2019-12-29 PROCEDURE — 40000170 ZZH STATISTIC PRE-PROCEDURE ASSESSMENT II: Performed by: ORTHOPAEDIC SURGERY

## 2019-12-29 PROCEDURE — 25800030 ZZH RX IP 258 OP 636: Performed by: PHYSICIAN ASSISTANT

## 2019-12-29 PROCEDURE — 25800030 ZZH RX IP 258 OP 636: Performed by: HOSPITALIST

## 2019-12-29 PROCEDURE — 25000132 ZZH RX MED GY IP 250 OP 250 PS 637: Performed by: ANESTHESIOLOGY

## 2019-12-29 PROCEDURE — C1776 JOINT DEVICE (IMPLANTABLE): HCPCS | Performed by: ORTHOPAEDIC SURGERY

## 2019-12-29 PROCEDURE — 25000132 ZZH RX MED GY IP 250 OP 250 PS 637: Performed by: ORTHOPAEDIC SURGERY

## 2019-12-29 PROCEDURE — 25800025 ZZH RX 258: Performed by: ORTHOPAEDIC SURGERY

## 2019-12-29 PROCEDURE — 25000128 H RX IP 250 OP 636: Performed by: HOSPITALIST

## 2019-12-29 PROCEDURE — 12000000 ZZH R&B MED SURG/OB

## 2019-12-29 PROCEDURE — 71000012 ZZH RECOVERY PHASE 1 LEVEL 1 FIRST HR: Performed by: ORTHOPAEDIC SURGERY

## 2019-12-29 DEVICE — IMP STEM SNN STD OFFSET SZ 6 71356006: Type: IMPLANTABLE DEVICE | Site: FEMUR | Status: FUNCTIONAL

## 2019-12-29 DEVICE — IMP SCR ACET SNN SPHERICAL HEAD 6.5X25MM 71332525: Type: IMPLANTABLE DEVICE | Site: ACETABULUM | Status: FUNCTIONAL

## 2019-12-29 DEVICE — IMP SHELL SNR ACET R3 3H 52MM 71335552: Type: IMPLANTABLE DEVICE | Site: ACETABULUM | Status: FUNCTIONAL

## 2019-12-29 DEVICE — IMP SCR ACET SNN SPHERICAL HEAD 6.5X30MM 71332530: Type: IMPLANTABLE DEVICE | Site: ACETABULUM | Status: FUNCTIONAL

## 2019-12-29 DEVICE — IMPLANTABLE DEVICE: Type: IMPLANTABLE DEVICE | Site: ACETABULUM | Status: FUNCTIONAL

## 2019-12-29 DEVICE — IMP HEAD FEMORAL SNR OXINIUM 12/14 36MM +0 71343600: Type: IMPLANTABLE DEVICE | Site: FEMUR | Status: FUNCTIONAL

## 2019-12-29 RX ORDER — NALOXONE HYDROCHLORIDE 0.4 MG/ML
.1-.4 INJECTION, SOLUTION INTRAMUSCULAR; INTRAVENOUS; SUBCUTANEOUS
Status: DISCONTINUED | OUTPATIENT
Start: 2019-12-29 | End: 2019-12-29 | Stop reason: HOSPADM

## 2019-12-29 RX ORDER — ONDANSETRON 4 MG/1
4 TABLET, ORALLY DISINTEGRATING ORAL EVERY 30 MIN PRN
Status: DISCONTINUED | OUTPATIENT
Start: 2019-12-29 | End: 2019-12-29 | Stop reason: HOSPADM

## 2019-12-29 RX ORDER — ONDANSETRON 2 MG/ML
4 INJECTION INTRAMUSCULAR; INTRAVENOUS EVERY 6 HOURS PRN
Status: DISCONTINUED | OUTPATIENT
Start: 2019-12-29 | End: 2020-01-02 | Stop reason: HOSPADM

## 2019-12-29 RX ORDER — SODIUM CHLORIDE, SODIUM LACTATE, POTASSIUM CHLORIDE, CALCIUM CHLORIDE 600; 310; 30; 20 MG/100ML; MG/100ML; MG/100ML; MG/100ML
INJECTION, SOLUTION INTRAVENOUS CONTINUOUS
Status: DISCONTINUED | OUTPATIENT
Start: 2019-12-29 | End: 2019-12-29 | Stop reason: HOSPADM

## 2019-12-29 RX ORDER — CEFAZOLIN SODIUM 1 G/3ML
1 INJECTION, POWDER, FOR SOLUTION INTRAMUSCULAR; INTRAVENOUS SEE ADMIN INSTRUCTIONS
Status: DISCONTINUED | OUTPATIENT
Start: 2019-12-29 | End: 2019-12-29

## 2019-12-29 RX ORDER — LIDOCAINE HYDROCHLORIDE 20 MG/ML
INJECTION, SOLUTION INFILTRATION; PERINEURAL PRN
Status: DISCONTINUED | OUTPATIENT
Start: 2019-12-29 | End: 2019-12-29

## 2019-12-29 RX ORDER — AMOXICILLIN 250 MG
2 CAPSULE ORAL 2 TIMES DAILY
Status: DISCONTINUED | OUTPATIENT
Start: 2019-12-29 | End: 2020-01-02 | Stop reason: HOSPADM

## 2019-12-29 RX ORDER — CEFAZOLIN SODIUM 2 G/100ML
2 INJECTION, SOLUTION INTRAVENOUS
Status: DISCONTINUED | OUTPATIENT
Start: 2019-12-29 | End: 2019-12-29

## 2019-12-29 RX ORDER — HYDROXYZINE HYDROCHLORIDE 25 MG/1
25-50 TABLET, FILM COATED ORAL EVERY 6 HOURS PRN
Status: DISCONTINUED | OUTPATIENT
Start: 2019-12-29 | End: 2020-01-02 | Stop reason: HOSPADM

## 2019-12-29 RX ORDER — DEXAMETHASONE SODIUM PHOSPHATE 4 MG/ML
INJECTION, SOLUTION INTRA-ARTICULAR; INTRALESIONAL; INTRAMUSCULAR; INTRAVENOUS; SOFT TISSUE PRN
Status: DISCONTINUED | OUTPATIENT
Start: 2019-12-29 | End: 2019-12-29

## 2019-12-29 RX ORDER — NEOSTIGMINE METHYLSULFATE 1 MG/ML
VIAL (ML) INJECTION PRN
Status: DISCONTINUED | OUTPATIENT
Start: 2019-12-29 | End: 2019-12-29

## 2019-12-29 RX ORDER — SODIUM CHLORIDE, SODIUM LACTATE, POTASSIUM CHLORIDE, CALCIUM CHLORIDE 600; 310; 30; 20 MG/100ML; MG/100ML; MG/100ML; MG/100ML
INJECTION, SOLUTION INTRAVENOUS CONTINUOUS
Status: DISCONTINUED | OUTPATIENT
Start: 2019-12-29 | End: 2019-12-30 | Stop reason: CLARIF

## 2019-12-29 RX ORDER — ONDANSETRON 2 MG/ML
4 INJECTION INTRAMUSCULAR; INTRAVENOUS EVERY 30 MIN PRN
Status: DISCONTINUED | OUTPATIENT
Start: 2019-12-29 | End: 2019-12-29 | Stop reason: HOSPADM

## 2019-12-29 RX ORDER — ACETAMINOPHEN 325 MG/1
650 TABLET ORAL EVERY 4 HOURS PRN
Status: DISCONTINUED | OUTPATIENT
Start: 2020-01-01 | End: 2020-01-02 | Stop reason: HOSPADM

## 2019-12-29 RX ORDER — OXYCODONE HYDROCHLORIDE 5 MG/1
5 TABLET ORAL EVERY 4 HOURS PRN
Status: CANCELLED | OUTPATIENT
Start: 2019-12-29

## 2019-12-29 RX ORDER — HYDROMORPHONE HYDROCHLORIDE 1 MG/ML
.3-.5 INJECTION, SOLUTION INTRAMUSCULAR; INTRAVENOUS; SUBCUTANEOUS EVERY 10 MIN PRN
Status: DISCONTINUED | OUTPATIENT
Start: 2019-12-29 | End: 2019-12-29 | Stop reason: HOSPADM

## 2019-12-29 RX ORDER — POLYETHYLENE GLYCOL 3350 17 G/17G
17 POWDER, FOR SOLUTION ORAL DAILY
Status: DISCONTINUED | OUTPATIENT
Start: 2019-12-29 | End: 2020-01-01

## 2019-12-29 RX ORDER — HYDROMORPHONE HYDROCHLORIDE 1 MG/ML
0.2 INJECTION, SOLUTION INTRAMUSCULAR; INTRAVENOUS; SUBCUTANEOUS
Status: DISCONTINUED | OUTPATIENT
Start: 2019-12-29 | End: 2019-12-29

## 2019-12-29 RX ORDER — ONDANSETRON 4 MG/1
4 TABLET, ORALLY DISINTEGRATING ORAL EVERY 6 HOURS PRN
Status: DISCONTINUED | OUTPATIENT
Start: 2019-12-29 | End: 2020-01-02 | Stop reason: HOSPADM

## 2019-12-29 RX ORDER — CLINDAMYCIN PHOSPHATE 900 MG/50ML
900 INJECTION, SOLUTION INTRAVENOUS SEE ADMIN INSTRUCTIONS
Status: DISCONTINUED | OUTPATIENT
Start: 2019-12-29 | End: 2019-12-29 | Stop reason: HOSPADM

## 2019-12-29 RX ORDER — BISACODYL 10 MG
10 SUPPOSITORY, RECTAL RECTAL DAILY PRN
Status: DISCONTINUED | OUTPATIENT
Start: 2019-12-29 | End: 2020-01-01

## 2019-12-29 RX ORDER — FENTANYL CITRATE 50 UG/ML
INJECTION, SOLUTION INTRAMUSCULAR; INTRAVENOUS PRN
Status: DISCONTINUED | OUTPATIENT
Start: 2019-12-29 | End: 2019-12-29

## 2019-12-29 RX ORDER — FENTANYL CITRATE 50 UG/ML
25-50 INJECTION, SOLUTION INTRAMUSCULAR; INTRAVENOUS
Status: CANCELLED | OUTPATIENT
Start: 2019-12-29

## 2019-12-29 RX ORDER — ONDANSETRON 2 MG/ML
INJECTION INTRAMUSCULAR; INTRAVENOUS PRN
Status: DISCONTINUED | OUTPATIENT
Start: 2019-12-29 | End: 2019-12-29

## 2019-12-29 RX ORDER — ACETAMINOPHEN 325 MG/1
975 TABLET ORAL ONCE
Status: COMPLETED | OUTPATIENT
Start: 2019-12-29 | End: 2019-12-29

## 2019-12-29 RX ORDER — GLYCOPYRROLATE 0.2 MG/ML
INJECTION, SOLUTION INTRAMUSCULAR; INTRAVENOUS PRN
Status: DISCONTINUED | OUTPATIENT
Start: 2019-12-29 | End: 2019-12-29

## 2019-12-29 RX ORDER — ACETAMINOPHEN 325 MG/1
975 TABLET ORAL EVERY 8 HOURS
Status: COMPLETED | OUTPATIENT
Start: 2019-12-29 | End: 2020-01-01

## 2019-12-29 RX ORDER — NALOXONE HYDROCHLORIDE 0.4 MG/ML
.1-.4 INJECTION, SOLUTION INTRAMUSCULAR; INTRAVENOUS; SUBCUTANEOUS
Status: DISCONTINUED | OUTPATIENT
Start: 2019-12-29 | End: 2019-12-29

## 2019-12-29 RX ORDER — LIDOCAINE 40 MG/G
CREAM TOPICAL
Status: DISCONTINUED | OUTPATIENT
Start: 2019-12-29 | End: 2020-01-02 | Stop reason: HOSPADM

## 2019-12-29 RX ORDER — PROPOFOL 10 MG/ML
INJECTION, EMULSION INTRAVENOUS PRN
Status: DISCONTINUED | OUTPATIENT
Start: 2019-12-29 | End: 2019-12-29

## 2019-12-29 RX ORDER — PROPOFOL 10 MG/ML
INJECTION, EMULSION INTRAVENOUS CONTINUOUS PRN
Status: DISCONTINUED | OUTPATIENT
Start: 2019-12-29 | End: 2019-12-29

## 2019-12-29 RX ORDER — FENTANYL CITRATE 50 UG/ML
25-50 INJECTION, SOLUTION INTRAMUSCULAR; INTRAVENOUS
Status: DISCONTINUED | OUTPATIENT
Start: 2019-12-29 | End: 2019-12-29 | Stop reason: HOSPADM

## 2019-12-29 RX ORDER — CLINDAMYCIN PHOSPHATE 900 MG/50ML
900 INJECTION, SOLUTION INTRAVENOUS
Status: DISCONTINUED | OUTPATIENT
Start: 2019-12-29 | End: 2019-12-29 | Stop reason: HOSPADM

## 2019-12-29 RX ORDER — CLINDAMYCIN PHOSPHATE 900 MG/50ML
900 INJECTION, SOLUTION INTRAVENOUS ONCE
Status: COMPLETED | OUTPATIENT
Start: 2019-12-29 | End: 2019-12-29

## 2019-12-29 RX ORDER — OXYCODONE HYDROCHLORIDE 5 MG/1
5-10 TABLET ORAL
Status: DISCONTINUED | OUTPATIENT
Start: 2019-12-29 | End: 2020-01-02 | Stop reason: HOSPADM

## 2019-12-29 RX ORDER — OXYCODONE HCL 10 MG/1
10 TABLET, FILM COATED, EXTENDED RELEASE ORAL ONCE
Status: COMPLETED | OUTPATIENT
Start: 2019-12-29 | End: 2019-12-29

## 2019-12-29 RX ORDER — CLINDAMYCIN PHOSPHATE 900 MG/50ML
900 INJECTION, SOLUTION INTRAVENOUS EVERY 8 HOURS
Status: COMPLETED | OUTPATIENT
Start: 2019-12-29 | End: 2019-12-30

## 2019-12-29 RX ORDER — MEPERIDINE HYDROCHLORIDE 25 MG/ML
12.5 INJECTION INTRAMUSCULAR; INTRAVENOUS; SUBCUTANEOUS
Status: DISCONTINUED | OUTPATIENT
Start: 2019-12-29 | End: 2019-12-29 | Stop reason: HOSPADM

## 2019-12-29 RX ADMIN — ONDANSETRON 4 MG: 2 INJECTION INTRAMUSCULAR; INTRAVENOUS at 06:23

## 2019-12-29 RX ADMIN — HYDROMORPHONE HYDROCHLORIDE 0.5 MG: 1 INJECTION, SOLUTION INTRAMUSCULAR; INTRAVENOUS; SUBCUTANEOUS at 02:25

## 2019-12-29 RX ADMIN — HYDROMORPHONE HYDROCHLORIDE 0.25 MG: 1 INJECTION, SOLUTION INTRAMUSCULAR; INTRAVENOUS; SUBCUTANEOUS at 14:32

## 2019-12-29 RX ADMIN — ROCURONIUM BROMIDE 40 MG: 10 INJECTION INTRAVENOUS at 12:20

## 2019-12-29 RX ADMIN — SENNOSIDES AND DOCUSATE SODIUM 1 TABLET: 8.6; 5 TABLET ORAL at 20:16

## 2019-12-29 RX ADMIN — SODIUM CHLORIDE, POTASSIUM CHLORIDE, SODIUM LACTATE AND CALCIUM CHLORIDE: 600; 310; 30; 20 INJECTION, SOLUTION INTRAVENOUS at 13:52

## 2019-12-29 RX ADMIN — SODIUM CHLORIDE: 9 INJECTION, SOLUTION INTRAVENOUS at 06:23

## 2019-12-29 RX ADMIN — MIDAZOLAM 2 MG: 1 INJECTION INTRAMUSCULAR; INTRAVENOUS at 12:14

## 2019-12-29 RX ADMIN — TRANEXAMIC ACID 1 G: 100 INJECTION, SOLUTION INTRAVENOUS at 13:56

## 2019-12-29 RX ADMIN — OXYCODONE HYDROCHLORIDE 5 MG: 5 TABLET ORAL at 23:32

## 2019-12-29 RX ADMIN — ACETAMINOPHEN 975 MG: 325 TABLET, FILM COATED ORAL at 11:30

## 2019-12-29 RX ADMIN — DEXAMETHASONE SODIUM PHOSPHATE 4 MG: 4 INJECTION, SOLUTION INTRA-ARTICULAR; INTRALESIONAL; INTRAMUSCULAR; INTRAVENOUS; SOFT TISSUE at 12:48

## 2019-12-29 RX ADMIN — CLINDAMYCIN PHOSPHATE 900 MG: 900 INJECTION, SOLUTION INTRAVENOUS at 12:25

## 2019-12-29 RX ADMIN — HYDROMORPHONE HYDROCHLORIDE 0.5 MG: 1 INJECTION, SOLUTION INTRAMUSCULAR; INTRAVENOUS; SUBCUTANEOUS at 08:20

## 2019-12-29 RX ADMIN — PROPOFOL 150 MG: 10 INJECTION, EMULSION INTRAVENOUS at 12:20

## 2019-12-29 RX ADMIN — CLINDAMYCIN PHOSPHATE 900 MG: 900 INJECTION, SOLUTION INTRAVENOUS at 20:37

## 2019-12-29 RX ADMIN — SODIUM CHLORIDE, POTASSIUM CHLORIDE, SODIUM LACTATE AND CALCIUM CHLORIDE: 600; 310; 30; 20 INJECTION, SOLUTION INTRAVENOUS at 11:39

## 2019-12-29 RX ADMIN — GLYCOPYRROLATE 0.4 MG: 0.2 INJECTION, SOLUTION INTRAMUSCULAR; INTRAVENOUS at 13:59

## 2019-12-29 RX ADMIN — HYDROXYZINE HYDROCHLORIDE 25 MG: 25 TABLET, FILM COATED ORAL at 20:16

## 2019-12-29 RX ADMIN — LIDOCAINE HYDROCHLORIDE 80 MG: 20 INJECTION, SOLUTION INFILTRATION; PERINEURAL at 12:20

## 2019-12-29 RX ADMIN — FENTANYL CITRATE 100 MCG: 50 INJECTION, SOLUTION INTRAMUSCULAR; INTRAVENOUS at 12:14

## 2019-12-29 RX ADMIN — OXYCODONE HYDROCHLORIDE 5 MG: 5 TABLET ORAL at 19:07

## 2019-12-29 RX ADMIN — SODIUM CHLORIDE, POTASSIUM CHLORIDE, SODIUM LACTATE AND CALCIUM CHLORIDE: 600; 310; 30; 20 INJECTION, SOLUTION INTRAVENOUS at 17:23

## 2019-12-29 RX ADMIN — ONDANSETRON 4 MG: 2 INJECTION INTRAMUSCULAR; INTRAVENOUS at 12:48

## 2019-12-29 RX ADMIN — PROCHLORPERAZINE EDISYLATE 10 MG: 5 INJECTION INTRAMUSCULAR; INTRAVENOUS at 08:16

## 2019-12-29 RX ADMIN — ACETAMINOPHEN 975 MG: 325 TABLET, FILM COATED ORAL at 19:04

## 2019-12-29 RX ADMIN — PROPOFOL 150 MCG/KG/MIN: 10 INJECTION, EMULSION INTRAVENOUS at 12:24

## 2019-12-29 RX ADMIN — HYDROMORPHONE HYDROCHLORIDE 0.25 MG: 1 INJECTION, SOLUTION INTRAMUSCULAR; INTRAVENOUS; SUBCUTANEOUS at 13:55

## 2019-12-29 RX ADMIN — TRANEXAMIC ACID 1 G: 100 INJECTION, SOLUTION INTRAVENOUS at 12:31

## 2019-12-29 RX ADMIN — HYDROMORPHONE HYDROCHLORIDE 0.5 MG: 1 INJECTION, SOLUTION INTRAMUSCULAR; INTRAVENOUS; SUBCUTANEOUS at 06:23

## 2019-12-29 RX ADMIN — NEOSTIGMINE METHYLSULFATE 3 MG: 1 INJECTION, SOLUTION INTRAVENOUS at 13:59

## 2019-12-29 RX ADMIN — OXYCODONE HYDROCHLORIDE 10 MG: 10 TABLET, FILM COATED, EXTENDED RELEASE ORAL at 11:30

## 2019-12-29 RX ADMIN — HYDROMORPHONE HYDROCHLORIDE 0.5 MG: 1 INJECTION, SOLUTION INTRAMUSCULAR; INTRAVENOUS; SUBCUTANEOUS at 10:16

## 2019-12-29 ASSESSMENT — ACTIVITIES OF DAILY LIVING (ADL)
ADLS_ACUITY_SCORE: 12
ADLS_ACUITY_SCORE: 14

## 2019-12-29 ASSESSMENT — ENCOUNTER SYMPTOMS: SEIZURES: 0

## 2019-12-29 NOTE — BRIEF OP NOTE
[facility]    Brief Operative Note    Pre-operative diagnosis: Right femoral neck fracture  Post-operative diagnosis Right femoral neck fracture    Procedure: Procedure(s):  ARTHROPLASTY, RIGHT HIP, TOTAL. [employer].  Surgeon: Surgeon(s) and Role:     * Chace Brito MD - Primary     * Larisa Lowery PA-C - Assisting  Anesthesia: General   Estimated blood loss: 200 mL  Drains: None  Specimens: * No specimens in log *  Findings:   None.  Complications: None.  Implants:   Implant Name Type Inv. Item Serial No.  Lot No. LRB No. Used   IMP SHELL SNR ACET R3 3H 52MM [id number] Total Joint Component/Insert IMP SHELL SNR ACET R3 3H 52MM [id number]  [place]  [id number] Right 1   IMP SCR ACET SNN SPHERICAL HEAD 6.5X25MM [id number] Metallic Hardware/Louann IMP SCR ACET SNN SPHERICAL HEAD 6.5X25MM [id number]  [place]  [id number] Right 1   IMP SCR ACET SNN SPHERICAL HEAD 6.5X30MM [id number] Metallic Hardware/Louann IMP SCR ACET SNN SPHERICAL HEAD 6.5X30MM [id number]  [place]  [id number] Right 1   IMP LINER S&amp;N ACET R3 XLPE [id number] 20DEG [id number] Total Joint Component/Insert IMP LINER S&amp;N ACET R3 XLPE [id number] 20DEG [id number]  [place]  [id number] Right 1   IMP STEM SNN STD OFFSET SZ 6 [id number] Total Joint Component/Insert IMP STEM SNN STD OFFSET SZ 6 [id number]  [place]  [id number] Right 1   IMP HEAD FEMORAL SNR OXINIUM 12/14 36MM +0 [id number] Total Joint Component/Insert IMP HEAD FEMORAL SNR OXINIUM 12/14 36MM +0 [id number]  [place]  [id number] Right 1     Plan:  WBAT  Posterior hip precautions  DVT prophylaxis - SCDs & Lovenox, then  mg BID x 4 weeks  Ancef x 24 hours  PACU XRs  PT  Keep dressing C/D/I for 2 weeks; okay to shower    Follow up with Larisa Wolfe PA-C in clinic in 2 weeks.

## 2019-12-29 NOTE — ANESTHESIA PREPROCEDURE EVALUATION
Anesthesia Pre-Procedure Evaluation    Patient: Antoinette Collazo   MRN: [id number] : 1955          Preoperative Diagnosis: * No pre-op diagnosis entered *    Procedure(s):  ARTHROPLASTY, HIP, TOTAL. [employer].    No past medical history on file.  No past surgical history on file.    Anesthesia Evaluation     . Pt has had prior anesthetic.     No history of anesthetic complications          ROS/MED HX    ENT/Pulmonary:  - neg pulmonary ROS    (-) asthma, sleep apnea and recent URI   Neurologic:  - neg neurologic ROS    (-) seizures   Cardiovascular:  - neg cardiovascular ROS      (-) hypertension   METS/Exercise Tolerance:  >4 METS   Hematologic:  - neg hematologic  ROS       Musculoskeletal:   (+) fracture lower extremity: Hip, -       GI/Hepatic:  - neg GI/hepatic ROS      (-) GERD   Renal/Genitourinary:  - ROS Renal section negative       Endo:  - neg endo ROS       Psychiatric:  - neg psychiatric ROS       Infectious Disease:  - neg infectious disease ROS       Malignancy:      - no malignancy   Other:                          Physical Exam  Normal systems: dental    Airway   Mallampati: II  TM distance: >3 FB  Neck ROM: full    Dental     Cardiovascular   Rhythm and rate: regular      Pulmonary             Lab Results   Component Value Date    WBC 10.9 2019    HGB 13.4 2019    HCT 42.3 2019     2019     2019    POTASSIUM 3.8 2019    CHLORIDE 107 2019    CO2 30 2019    BUN 11 2019    CR 0.64 2019     (H) 2019    ABIEL 9.0 2019       Preop Vitals  BP Readings from Last 3 Encounters:   19 (!) 140/63   09 138/64    Pulse Readings from Last 3 Encounters:   19 100      Resp Readings from Last 3 Encounters:   19 18    SpO2 Readings from Last 3 Encounters:   19 94%   09 98%      Temp Readings from Last 1 Encounters:   19 37.6  C (99.7  F) (Oral)    Ht Readings from Last  1 Encounters:   No data found for Ht      Wt Readings from Last 1 Encounters:   06/22/09 56.7 kg (125 lb)    There is no height or weight on file to calculate BMI.       Anesthesia Plan      History & Physical Review  History and physical reviewed and following examination; no interval change.    ASA Status:  2 .    NPO Status:  > 8 hours    Plan for General and LMA with Propofol induction. Maintenance will be Balanced.    PONV prophylaxis:  Ondansetron (or other 5HT-3) and Dexamethasone or Solumedrol       Postoperative Care  Postoperative pain management:  IV analgesics and Oral pain medications.      Consents  Anesthetic plan, risks, benefits and alternatives discussed with:  Patient..                 Esequiel Gordon MD

## 2019-12-29 NOTE — ED NOTES
RECEIVING UNIT ED HANDOFF REVIEW    ED Nurse Handoff Report was reviewed by: Ayanna Hauser RN on December 28, 2019 at 8:04 PM

## 2019-12-29 NOTE — PROVIDER NOTIFICATION
Prescriber Notification Note    The pharmacist has communicated with this patient's provider regarding a concern or therapy recommendation.    Notified Person: Tiffanie Wolfe  Date/Time of Notification: 12/29/19 7331  Interaction: phone  Concern/Recommendation:lip and facial swelling to ancef (new information) and amocillin     Comments/Additional Details:change ancef to clindamycin standard pre and intra op dosing.

## 2019-12-29 NOTE — ANESTHESIA POSTPROCEDURE EVALUATION
Patient: Antoinette Collazo    Procedure(s):  ARTHROPLASTY, RIGHT HIP, TOTAL. [employer].    Diagnosis:* No pre-op diagnosis entered *  Diagnosis Additional Information: No value filed.    Anesthesia Type:  General, LMA    Note:  Anesthesia Post Evaluation    Patient location during evaluation: PACU  Patient participation: Able to fully participate in evaluation  Level of consciousness: awake and alert  Pain management: adequate  Airway patency: patent  Cardiovascular status: acceptable and hemodynamically stable  Respiratory status: acceptable  Hydration status: euvolemic  PONV: none     Anesthetic complications: None          Last vitals:  Vitals:    12/29/19 1500 12/29/19 1510 12/29/19 1520   BP: 133/76 123/70 129/66   Pulse: 89 80 78   Resp: 11 11 12   Temp:      SpO2: 97% 98% 94%         Electronically Signed By: Esequiel Gordon MD  December 29, 2019  3:30 PM

## 2019-12-29 NOTE — ED NOTES
"[facility]  ED Nurse Handoff Report    ED Chief complaint: Fall      ED Diagnosis:   Final diagnoses:   Hip fracture, right, closed, initial encounter (H)       Code Status: Full Code    Allergies:   Allergies   Allergen Reactions     Amoxicillin      Ibuprofen        Activity level - Baseline/Home:  Independent  Activity Level - Current:   Total Care    Patient's Preferred language: English   Needed?: No    Isolation: No  Infection: Not Applicable  Bariatric?: No    Vital Signs:   Vitals:    12/28/19 1748 12/28/19 1815 12/28/19 1817 12/28/19 1830   BP:  134/72  130/67   Pulse:  74  69   Resp:       Temp:       TempSrc:       SpO2: 93%  96% 98%       Cardiac Rhythm: ,        Pain level:      Is this patient confused?: No   Does this patient have a guardian?  No         If yes, is there guardianship documents in the Epic \"Code/ACP\" activity?  No         Guardian Notified?  No  [place] - Suicide Severity Rating Scale Completed?  Yes  If yes, what color did the patient score?  White    Patient Report: Initial Complaint: Fall, hip pain  Focused Assessment: Patient had a fall earlier today.  Fractured right hip  Tests Performed: labs, imaging  Abnormal Results:   Labs Ordered and Resulted from Time of ED Arrival Up to the Time of Departure from the ED   CBC WITH PLATELETS DIFFERENTIAL   BASIC METABOLIC PANEL   PERIPHERAL IV CATHETER       Treatments provided: Pain meds    Family Comments:  at bedside    OBS brochure/video discussed/provided to patient/family: N/A              Name of person given brochure if not patient:               Relationship to patient:     ED Medications:   Medications   HYDROmorphone (PF) (DILAUDID) injection 0.5 mg (0.5 mg Intravenous Given 12/28/19 1826)   0.9% sodium chloride BOLUS (1,000 mLs Intravenous New Bag 12/28/19 1827)     Followed by   sodium chloride 0.9% infusion (has no administration in time range)       Drips infusing?:  No    For the majority " of the shift this patient was Green.   Interventions performed were .    Severe Sepsis OR Septic Shock Diagnosis Present: No    To be done/followed up on inpatient unit:      ED NURSE PHONE NUMBER: *[phone]

## 2019-12-29 NOTE — H&P
Admitted:     12/28/2019      PRIMARY CARE PHYSICIAN:  Zach Mancera MD      CHIEF COMPLAINT:  Fall with right hip pain.      HISTORY OF PRESENT ILLNESS:  Ms. Braldy Collazo is a pleasant 64-year-old female with no significant past medical history, who was walking her dog today when she slipped on ice and fell, causing right hip pain.  She had no head injury, no loss of consciousness, no nausea or vomiting.  Denied chest pain or shortness of breath.  No pain in abdomen.  Reports a normal bowel and bladder habit.  EMS was called, and the patient was brought to ER for further evaluation, was seen by Dr. Sood in the ER and was noted with a right femoral neck fracture.  Orthopedics was contacted, who suggested admission to hospitalist with plans to have OR in a.m.      REVIEW OF SYSTEMS:  A 10-point review of system was done and was negative apart from those mentioned in the history of present illness.      PAST MEDICAL HISTORY:   1.  Hysterectomy.   2.  History of tubal pregnancy.      MEDICATIONS PRIOR TO ADMISSION:  Medications Prior to Admission   Medication Sig Dispense Refill Last Dose     conjugated estrogens (PREMARIN) 0.625 MG/GM vaginal cream Place 0.5 g vaginally once a week   Past Week at Unknown time     latanoprost (XALATAN) 0.005 % ophthalmic solution Place 1 drop into both eyes daily   12/27/2019 at HS     sodium chloride (OCEAN) 0.65 % nasal spray Spray 1 spray into both nostrils daily as needed for congestion   12/28/2019 at Unknown time        ALLERGIES:  AMOXICILLIN, IBUPROFEN.      SOCIAL HISTORY:  Denies smoking, takes occasional alcohol, no illicit drug use.      FAMILY HISTORY:  Reviewed and not pertinent to current presentation.      PHYSICAL EXAMINATION:   GENERAL:  The patient is conscious, alert, oriented x 3, lying comfortably in bed in no apparent distress.   VITAL SIGNS:  Temperature 98, heart rate of 69, blood pressure 130/67, saturation 98% on room air.   HEENT:  Pupils equal and  reactive to light and accommodation.  Extraocular movements are intact.  Oral mucosa moist.   NECK:  Supple, no raised JVD.   RESPIRATORY:  Lung sounds bilaterally clear to auscultation, no wheezes or crepitation.   CARDIOVASCULAR:  Normal S1-S2, regular rate and rhythm, no murmur.   ABDOMEN:  Soft, nontender, nondistended, no guarding, rigidity or rebound tenderness.   LOWER EXTREMITIES:  With no edema.   MUSCULOSKELETAL:  Range of motion limited at right hip joint due to pain.   NEUROLOGIC:  No focal neurological deficits noted.  Cranial nerves 2-12 grossly intact.   PSYCHIATRIC:  Normal mood and affect.      LABORATORY AND IMAGING:  CBC and BMP reviewed in Epic are unremarkable.  Hemoglobin 14.2.  Blood glucose 124.  Chest x-ray reviewed by me shows no acute infiltrate, effusion or pneumothorax.  X-ray pelvis reviewed by me shows acute, simple, transversely oriented fracture of the right femoral neck, mildly displaced and angulated.      ASSESSMENT AND PLAN:  Ms. Bradly Collazo is a 64-year-old pleasant female with no significant past medical history, who presented to ED following a mechanical fall, resulting in right femoral neck fracture.     1.  Right femoral neck fracture following a mechanical fall.   -  We will admit as inpatient.  Orthopedics contacted.  Plan for OR tomorrow.  We will keep n.p.o. after midnight.  IV fluids, pain medications p.r.n. available.  No significant cardiac history, no history of CHF.  Can proceed with proposed surgery without further risk stratification.     2.  Deep venous thrombosis prophylaxis:  Mechanical with PCD boots.      CODE STATUS:  Full code.         HESHAM YORK MD             D: 2019   T: 2019   MT: MALACHI      Name:     BRIONNA GREEN   MRN:      [id number]        Account:      [id number]   :      1955        Admitted:     2019                   Document: [id number]       cc: Zach Mancera MD

## 2019-12-29 NOTE — ANESTHESIA CARE TRANSFER NOTE
Patient: Antoinette Collazo    Procedure(s):  ARTHROPLASTY, RIGHT HIP, TOTAL. [employer].    Diagnosis: * No pre-op diagnosis entered *  Diagnosis Additional Information: No value filed.    Anesthesia Type:   General, LMA     Note:  Airway :Face Mask  Patient transferred to:PACU  Handoff Report: Identifed the Patient, Identified the Reponsible Provider, Reviewed the pertinent medical history, Discussed the surgical course, Reviewed Intra-OP anesthesia mangement and issues during anesthesia, Set expectations for post-procedure period and Allowed opportunity for questions and acknowledgement of understanding      Vitals: (Last set prior to Anesthesia Care Transfer)    CRNA VITALS  12/29/2019 1401 - 12/29/2019 1438      12/29/2019             Resp Rate (set):  10                Electronically Signed By: LETY eKlly CRNA  December 29, 2019  2:38 PM

## 2019-12-29 NOTE — PROGRESS NOTES
[facility]    Hospitalist Progress Note      Assessment & Plan   Antoinette Collazo is a 64 year old female with no significant past medical history who was admitted on 12/28/2019 after a mechanical fall resulting in right femoral neck fracture.    Displaced right femoral neck fracture: Due to mechanical fall after slipping on the ice while walking her dog.  -  Ortho following - to OR today for repair    -  Cont analgesics, pain control   -  PT/OT consult post-op  - constipation prevention while on opiates    Urinary retention:   -  Likely due to opiates    -  Bladder scan was 471, so Longoria placed overnight on 12/29   -  Voiding trial once ambulatory    DVT Prophylaxis: Enoxaparin (Lovenox) SQ  Code Status: Full Code  Expected discharge: TBD, recommended to TBD once adequate pain management/ tolerating PO medications, safe disposition plan/ TCU bed available and ambulating.    Daisy Tate MD  Text Page  (7am - 6pm)    Interval History   Having significant pain in right leg and inability to straighten it out. She was unable to sleep last night due to pain. One episode of emesis this AM per nursing. Denies SOB, other pain. Feels like there are muscle spasms in her leg.    -Data reviewed today: I reviewed all new labs and imaging results over the last 24 hours. I personally reviewed no images or EKG's today.    Physical Exam   Temp: 99.7  F (37.6  C) Temp src: Oral BP: (!) 140/63 Pulse: 100 Heart Rate: 95 Resp: 18 SpO2: 94 % O2 Device: Nasal cannula Oxygen Delivery: 1/2 LPM  There were no vitals filed for this visit.  Vital Signs with Ranges  Temp:  [97.8  F (36.6  C)-99.7  F (37.6  C)] 99.7  F (37.6  C)  Pulse:  [] 100  Heart Rate:  [72-95] 95  Resp:  [18] 18  BP: ()/(56-75) 140/63  SpO2:  [91 %-98 %] 94 %  I/O last 3 completed shifts:  In: 1000 [IV Piggyback:1000]  Out: 600 [Urine:600]    Constitutional: WDWN female lying in bed in mild distress due to pain  HEENT: NC/AT, no  scleral icterus, nl conjunctiva, dry oral mucosa, nl dentition  Respiratory: good inspiratory effort, lungs CTAB  Cardiovascular: RRR, no M/R/G, 2+ radial/DP pulses, no peripheral edema  GI: soft, nontender, nondistended, no rebound/guarding  Skin: no rashes, warm, dry, intact  Neuro: CN 2-12 grossly intact, sensation slightly decreased in left foot (she reports this is intermittent for her)  MSK:  able to move all 4 exts, no cyanosis/clubbing, left leg externally rotated, foreshortened  Psych: nl mood/affect/judgment      Medications     lactated ringers       sodium chloride 100 mL/hr at 12/29/19 0623       clindamycin  900 mg Intravenous Pre-Op/Pre-procedure x 1 dose     clindamycin  900 mg Intravenous See Admin Instructions     sodium chloride (PF)  3 mL Intracatheter Q8H       Data   Recent Labs   Lab 12/29/19  0707 12/28/19  1830   WBC  --  10.9   HGB 13.4 14.2   MCV  --  92   PLT  --  293   NA  --  141   POTASSIUM  --  3.8   CHLORIDE  --  107   CO2  --  30   BUN  --  11   CR  --  0.64   ANIONGAP  --  4   ABIEL  --  9.0   GLC  --  124*       Recent Results (from the past 24 hour(s))   XR Chest 1 View    Narrative    Examination:  XR CHEST 1 VW    Date:  12/28/2019 6:07 PM     Clinical Information: Acute right hip injury. Presurgical  planning/screening exam.    Additional Information: none    Comparison: none    Findings:     Clear lungs without focal infiltrate, pulmonary edema, or pleural  fluid. Normal heart and pulmonary vasculature. No acute osseous  abnormality in the chest.      Impression    Impression:    1. Normal chest. Clear lungs.    ELVIRA PAUL MD   XR Pelvis and Hip Right 1 View    Narrative    Examination:  XR PELVIS AND HIP RIGHT 1 VIEW    Date:  12/28/2019 6:08 PM     Clinical Information: pain after blunt trauma today     Additional Information: none    Comparison: none      Impression    Impression:  1.  Acute, simple, transversely oriented fracture of the right femoral  neck, mildly  displaced/angulated.  2.  Normal right hip joint alignment maintained. Maintained joint  space.  3.  The remainder of the pelvis and left hip are normal.    ELVIRA PAUL MD

## 2019-12-29 NOTE — PROVIDER NOTIFICATION
Brief update:    Paged re: urinary retention, bladder scan 471, pt uncomfortable and unable to urinate    Ochoa catheter ordered. Here w/ femur fx, likely OR in afternoon.    Keep ochoa in until post op; can be removed for trial of voiding when ambulatory.    Khari Borja MD  3:04 AM

## 2019-12-29 NOTE — CONSULTS
[facility]    Orthopedic Consultation    Antoinette Collazo MRN# [id number]   Age: 64 year old YOB: 1955     Date of Admission:  12/28/2019    Reason for consult: R femoral neck fracture       Requesting physician: Dr. Pisano       Level of consult: Consult, follow and place orders           Assessment and Plan:   Assessment:   Femoral neck fracture        Plan:   Total hip arthoplasty           Chief Complaint:   Femoral neck fracture         History of Present Illness:   This patient is a 64 year old female who presents with the following condition requiring a hospital admission:    65 yo F who was walking her dog yesterday when she slipped on ice and fell. She landed on her R hip and had immediate pain and inability to bear weight. She was then brought to the ED and found to have a displaced femoral neck fracture. No issues with the R hip prior to the fall. Does not use assistive devices. No numbness/tingling and no other major medical problems. Pain is currently in the R hip and is deep and severe and worsened with any movement. Pain meds are helping. No other injuries. No numbness/tingling          Past Medical History:   No past medical history on file.          Past Surgical History:   No past surgical history on file.          Social History:     Social History     Tobacco Use     Smoking status: Not on file   Substance Use Topics     Alcohol use: Not on file             Family History:   No family history on file.          Immunizations:     VACCINE/DOSE   Diptheria   DPT   DTAP   HBIG   Hepatitis A   Hepatitis B   HIB   Influenza   Measles   Meningococcal   MMR   Mumps   Pneumococcal   Polio   Rubella   Small Pox   TDAP   Varicella   Zoster             Allergies:     Allergies   Allergen Reactions     Amoxicillin Swelling and Rash     Ancef [Cefazolin] Swelling and Rash     Ibuprofen Nausea and Vomiting     Toradol [Ketorolac Tromethamine] Nausea and Vomiting              Medications:     Current Facility-Administered Medications   Medication     [Auto Hold] acetaminophen (TYLENOL) tablet 650 mg     [Auto Hold] bisacodyl (DULCOLAX) Suppository 10 mg     clindamycin (CLEOCIN) infusion 900 mg     [Auto Hold] HYDROcodone-acetaminophen (NORCO) 5-325 MG per tablet 1-2 tablet     [Auto Hold] HYDROmorphone (PF) (DILAUDID) injection 0.3-0.5 mg     [Auto Hold] lidocaine (LMX4) cream     [Auto Hold] lidocaine 1 % 0.1-1 mL     [Auto Hold] magnesium hydroxide (MILK OF MAGNESIA) suspension 30 mL     [Auto Hold] melatonin tablet 1 mg     [Auto Hold] naloxone (NARCAN) injection 0.1-0.4 mg     [Auto Hold] ondansetron (ZOFRAN-ODT) ODT tab 4 mg    Or     [Auto Hold] ondansetron (ZOFRAN) injection 4 mg     [Auto Hold] prochlorperazine (COMPAZINE) injection 10 mg    Or     [Auto Hold] prochlorperazine (COMPAZINE) tablet 10 mg    Or     [Auto Hold] prochlorperazine (COMPAZINE) Suppository 25 mg     [Auto Hold] senna-docusate (SENOKOT-S/PERICOLACE) 8.6-50 MG per tablet 1 tablet    Or     [Auto Hold] senna-docusate (SENOKOT-S/PERICOLACE) 8.6-50 MG per tablet 2 tablet     [Auto Hold] sodium chloride (PF) 0.9% PF flush 3 mL     [Auto Hold] sodium chloride (PF) 0.9% PF flush 3 mL     sodium chloride 0.9% infusion             Review of Systems:   All review of systems was performed and was negative except as per hpi          Physical Exam:   All vitals have been reviewed  Patient Vitals for the past 24 hrs:   BP Temp Temp src Pulse Heart Rate Resp SpO2   12/29/19 1116 (!) 149/75 -- -- -- 85 20 97 %   12/29/19 0857 (!) 140/63 -- -- -- 95 18 94 %   12/29/19 0820 -- -- -- -- -- 18 --   12/28/19 2331 134/75 99.7  F (37.6  C) Oral 100 -- 18 91 %   12/28/19 2038 132/70 97.8  F (36.6  C) Oral 88 -- 18 92 %   12/28/19 1830 130/67 -- -- 69 -- -- 98 %   12/28/19 1817 -- -- -- -- -- -- 96 %   12/28/19 1815 134/72 -- -- 74 -- -- --   12/28/19 1748 -- -- -- -- -- -- 93 %   12/28/19 1745 (!) 87/56 98  F (36.7  C) Oral 81  72 18 93 %       Intake/Output Summary (Last 24 hours) at 12/29/2019 1138  Last data filed at 12/29/2019 0800  Gross per 24 hour   Intake 2036 ml   Output 700 ml   Net 1336 ml     NAD  nonlabored breathing  No peripheral edema  Skin intact  White sclera  RLE:  +pain with logroll  +Df/PF/EHL  Sensation intact throughout  Skin intact            Data:   All laboratory data reviewed  Results for orders placed or performed during the hospital encounter of 12/28/19   CBC with platelets differential     Status: None   Result Value Ref Range    WBC 10.9 4.0 - 11.0 10e9/L    RBC Count 4.59 3.8 - 5.2 10e12/L    Hemoglobin 14.2 11.7 - 15.7 g/dL    Hematocrit 42.3 35.0 - 47.0 %    MCV 92 78 - 100 fl    MCH 30.9 26.5 - 33.0 pg    MCHC 33.6 31.5 - 36.5 g/dL    RDW 12.7 10.0 - 15.0 %    Platelet Count 293 150 - 450 10e9/L    Diff Method Automated Method     % Neutrophils 70.3 %    % Lymphocytes 22.7 %    % Monocytes 5.8 %    % Eosinophils 0.5 %    % Basophils 0.3 %    % Immature Granulocytes 0.4 %    Nucleated RBCs 0 0 /100    Absolute Neutrophil 7.7 1.6 - 8.3 10e9/L    Absolute Lymphocytes 2.5 0.8 - 5.3 10e9/L    Absolute Monocytes 0.6 0.0 - 1.3 10e9/L    Absolute Eosinophils 0.1 0.0 - 0.7 10e9/L    Absolute Basophils 0.0 0.0 - 0.2 10e9/L    Abs Immature Granulocytes 0.0 0 - 0.4 10e9/L    Absolute Nucleated RBC 0.0    Basic metabolic panel     Status: Abnormal   Result Value Ref Range    Sodium 141 133 - 144 mmol/L    Potassium 3.8 3.4 - 5.3 mmol/L    Chloride 107 94 - 109 mmol/L    Carbon Dioxide 30 20 - 32 mmol/L    Anion Gap 4 3 - 14 mmol/L    Glucose 124 (H) 70 - 99 mg/dL    Urea Nitrogen 11 7 - 30 mg/dL    Creatinine 0.64 0.52 - 1.04 mg/dL    GFR Estimate >90 >60 mL/min/[1.73_m2]    GFR Estimate If Black >90 >60 mL/min/[1.73_m2]    Calcium 9.0 8.5 - 10.1 mg/dL   Hemoglobin     Status: None   Result Value Ref Range    Hemoglobin 13.4 11.7 - 15.7 g/dL   ABO/Rh type and screen     Status: None   Result Value Ref Range    ABO A      RH(D) Pos     Antibody Screen Neg     Test Valid Only At [facility]        Specimen Expires 01/01/2020             Chace Brito MD

## 2019-12-29 NOTE — PHARMACY-ADMISSION MEDICATION HISTORY
Pharmacy Medication History  Admission medication history interview status for the 12/28/2019  admission is complete. See EPIC admission navigator for prior to admission medications     Medication history sources: Patient  Medication history source reliability: Good  Adherence assessment: Good    Significant changes made to the medication list:  Added below to med list      Medication reconciliation completed by provider prior to medication history? No    Time spent in this activity: 10 min       Prior to Admission medications    Medication Sig Last Dose Taking? Auth Provider   conjugated estrogens (PREMARIN) 0.625 MG/GM vaginal cream Place 0.5 g vaginally once a week Past Week at Unknown time Yes Unknown, Entered By History   latanoprost (XALATAN) 0.005 % ophthalmic solution Place 1 drop into both eyes daily 12/27/2019 at HS Yes Unknown, Entered By History   sodium chloride (OCEAN) 0.65 % nasal spray Spray 1 spray into both nostrils daily as needed for congestion 12/28/2019 at Unknown time Yes Unknown, Entered By History

## 2019-12-29 NOTE — PROVIDER NOTIFICATION
Patient arrived from PACU complaining of itching and rash on frontal torso. Called on call ortho  1800- Rash gone.   Dr Brito ordered atarax and benadryl.  Patient says benadryl does not help.

## 2019-12-30 ENCOUNTER — APPOINTMENT (OUTPATIENT)
Dept: PHYSICAL THERAPY | Facility: CLINIC | Age: 64
End: 2019-12-30
Payer: COMMERCIAL

## 2019-12-30 LAB
CREAT SERPL-MCNC: 0.64 MG/DL (ref 0.52–1.04)
GFR SERPL CREATININE-BSD FRML MDRD: >90 ML/MIN/{1.73_M2}
GLUCOSE BLDC GLUCOMTR-MCNC: 111 MG/DL (ref 70–99)
HGB BLD-MCNC: 11.4 G/DL (ref 11.7–15.7)
PLATELET # BLD AUTO: 208 10E9/L (ref 150–450)

## 2019-12-30 PROCEDURE — 25000132 ZZH RX MED GY IP 250 OP 250 PS 637: Performed by: PHYSICIAN ASSISTANT

## 2019-12-30 PROCEDURE — 25000125 ZZHC RX 250: Performed by: PHYSICIAN ASSISTANT

## 2019-12-30 PROCEDURE — 0SR902A REPLACEMENT OF RIGHT HIP JOINT WITH METAL ON POLYETHYLENE SYNTHETIC SUBSTITUTE, UNCEMENTED, OPEN APPROACH: ICD-10-PCS | Performed by: ORTHOPAEDIC SURGERY

## 2019-12-30 PROCEDURE — 25000128 H RX IP 250 OP 636: Performed by: HOSPITALIST

## 2019-12-30 PROCEDURE — 82565 ASSAY OF CREATININE: CPT | Performed by: PHYSICIAN ASSISTANT

## 2019-12-30 PROCEDURE — 97161 PT EVAL LOW COMPLEX 20 MIN: CPT | Mod: GP | Performed by: PHYSICAL THERAPIST

## 2019-12-30 PROCEDURE — 97116 GAIT TRAINING THERAPY: CPT | Mod: GP | Performed by: PHYSICAL THERAPIST

## 2019-12-30 PROCEDURE — 12000000 ZZH R&B MED SURG/OB

## 2019-12-30 PROCEDURE — 97530 THERAPEUTIC ACTIVITIES: CPT | Mod: GP | Performed by: PHYSICAL THERAPIST

## 2019-12-30 PROCEDURE — 99207 ZZC CDG-MDM COMPONENT: MEETS LOW - DOWN CODED: CPT | Performed by: HOSPITALIST

## 2019-12-30 PROCEDURE — 25000132 ZZH RX MED GY IP 250 OP 250 PS 637: Performed by: ORTHOPAEDIC SURGERY

## 2019-12-30 PROCEDURE — 25000128 H RX IP 250 OP 636: Performed by: PHYSICIAN ASSISTANT

## 2019-12-30 PROCEDURE — 85018 HEMOGLOBIN: CPT | Performed by: PHYSICIAN ASSISTANT

## 2019-12-30 PROCEDURE — 36415 COLL VENOUS BLD VENIPUNCTURE: CPT | Performed by: PHYSICIAN ASSISTANT

## 2019-12-30 PROCEDURE — 00000146 ZZHCL STATISTIC GLUCOSE BY METER IP

## 2019-12-30 PROCEDURE — 85049 AUTOMATED PLATELET COUNT: CPT | Performed by: PHYSICIAN ASSISTANT

## 2019-12-30 PROCEDURE — 97110 THERAPEUTIC EXERCISES: CPT | Mod: GP | Performed by: PHYSICAL THERAPIST

## 2019-12-30 PROCEDURE — 25800030 ZZH RX IP 258 OP 636: Performed by: PHYSICIAN ASSISTANT

## 2019-12-30 PROCEDURE — 99231 SBSQ HOSP IP/OBS SF/LOW 25: CPT | Performed by: HOSPITALIST

## 2019-12-30 RX ORDER — AMOXICILLIN 250 MG
1 CAPSULE ORAL 2 TIMES DAILY PRN
Qty: 20 TABLET | Refills: 0 | DISCHARGE
Start: 2019-12-30 | End: 2022-06-14

## 2019-12-30 RX ORDER — ACETAMINOPHEN 325 MG/1
650 TABLET ORAL EVERY 4 HOURS PRN
Qty: 30 TABLET | Refills: 0 | DISCHARGE
Start: 2020-01-01

## 2019-12-30 RX ORDER — ONDANSETRON 4 MG/1
4 TABLET, ORALLY DISINTEGRATING ORAL EVERY 6 HOURS PRN
Qty: 6 TABLET | Refills: 0 | DISCHARGE
Start: 2019-12-30 | End: 2022-06-14

## 2019-12-30 RX ORDER — OXYCODONE HYDROCHLORIDE 5 MG/1
5-10 TABLET ORAL EVERY 4 HOURS PRN
Qty: 25 TABLET | Refills: 0 | Status: SHIPPED | OUTPATIENT
Start: 2019-12-30 | End: 2022-06-14

## 2019-12-30 RX ADMIN — ACETAMINOPHEN 975 MG: 325 TABLET, FILM COATED ORAL at 02:45

## 2019-12-30 RX ADMIN — OXYCODONE HYDROCHLORIDE 5 MG: 5 TABLET ORAL at 04:14

## 2019-12-30 RX ADMIN — OXYCODONE HYDROCHLORIDE 10 MG: 5 TABLET ORAL at 13:03

## 2019-12-30 RX ADMIN — SENNOSIDES AND DOCUSATE SODIUM 2 TABLET: 8.6; 5 TABLET ORAL at 08:36

## 2019-12-30 RX ADMIN — HYDROXYZINE HYDROCHLORIDE 25 MG: 25 TABLET, FILM COATED ORAL at 13:12

## 2019-12-30 RX ADMIN — OXYCODONE HYDROCHLORIDE 10 MG: 5 TABLET ORAL at 08:36

## 2019-12-30 RX ADMIN — SODIUM CHLORIDE, POTASSIUM CHLORIDE, SODIUM LACTATE AND CALCIUM CHLORIDE: 600; 310; 30; 20 INJECTION, SOLUTION INTRAVENOUS at 04:14

## 2019-12-30 RX ADMIN — OXYCODONE HYDROCHLORIDE 10 MG: 5 TABLET ORAL at 16:26

## 2019-12-30 RX ADMIN — ACETAMINOPHEN 975 MG: 325 TABLET, FILM COATED ORAL at 20:01

## 2019-12-30 RX ADMIN — SENNOSIDES AND DOCUSATE SODIUM 2 TABLET: 8.6; 5 TABLET ORAL at 20:01

## 2019-12-30 RX ADMIN — OXYCODONE HYDROCHLORIDE 10 MG: 5 TABLET ORAL at 20:01

## 2019-12-30 RX ADMIN — HYDROMORPHONE HYDROCHLORIDE 0.5 MG: 1 INJECTION, SOLUTION INTRAMUSCULAR; INTRAVENOUS; SUBCUTANEOUS at 02:45

## 2019-12-30 RX ADMIN — ACETAMINOPHEN 975 MG: 325 TABLET, FILM COATED ORAL at 13:02

## 2019-12-30 RX ADMIN — ENOXAPARIN SODIUM 40 MG: 40 INJECTION SUBCUTANEOUS at 08:36

## 2019-12-30 RX ADMIN — HYDROXYZINE HYDROCHLORIDE 50 MG: 25 TABLET, FILM COATED ORAL at 04:15

## 2019-12-30 RX ADMIN — CLINDAMYCIN PHOSPHATE 900 MG: 900 INJECTION, SOLUTION INTRAVENOUS at 04:14

## 2019-12-30 ASSESSMENT — ACTIVITIES OF DAILY LIVING (ADL)
ADLS_ACUITY_SCORE: 14

## 2019-12-30 NOTE — PROGRESS NOTES
[facility]    Hospitalist Progress Note      Assessment & Plan   Antoinette Collazo is a 64 year old female with no significant past medical history who was admitted on 12/28/2019 after a mechanical fall resulting in right femoral neck fracture.    Displaced right femoral neck fracture: Due to mechanical fall after slipping on the ice while walking her dog.  -  Ortho following - POD #1 s/p right hip EITAN   -  Cont analgesics, pain control   -  PT/OT to work with pt today; await their recs for discharge planning  - constipation prevention while on opiates  - SCDs and Lovenox initially then ASA 325mg BID for 4 weeks  - Keep dressing C/D/I for 2 weeks; okay to shower  - Follow up with Larisa Wolfe PA-C in clinic in 2 weeks    Urinary retention:   -  Likely due to opiates    -  Bladder scan was 471, so Longoria placed overnight on 12/29  -  Voiding trial once ambulatory and hopefully remove soon    DVT Prophylaxis: Enoxaparin (Lovenox) SQ  Code Status: Full Code  Expected discharge: TBD, recommended to TBD once adequate pain management/ tolerating PO medications, safe disposition plan/ TCU bed available and PT eval complete.    Daisy Tate MD  Text Page  (7am - 6pm)    Interval History   Pt is doing well today. Pain is doing better after surgery. She denies nausea, trouble breathing. Getting ready to work with PT.    -Data reviewed today: I reviewed all new labs and imaging results over the last 24 hours. I personally reviewed no images or EKG's today.    Physical Exam   Temp: 99.8  F (37.7  C) Temp src: Oral BP: (!) 140/68 Pulse: 93 Heart Rate: 93 Resp: 18 SpO2: 92 % O2 Device: Nasal cannula Oxygen Delivery: 2 LPM  There were no vitals filed for this visit.  Vital Signs with Ranges  Temp:  [97.2  F (36.2  C)-99.8  F (37.7  C)] 99.8  F (37.7  C)  Pulse:  [60-96] 93  Heart Rate:  [] 93  Resp:  [7-21] 18  BP: (119-149)/(65-86) 140/68  SpO2:  [92 %-99 %] 92 %  I/O last 3 completed  shifts:  In: 4047.25 [P.O.:500; I.V.:3547.25]  Out: 2975 [Urine:2675; Emesis/NG output:100; Blood:200]    Constitutional: WDWN female sitting up at the edge of the bed in NAD  HEENT: NC/AT, no scleral icterus, nl conjunctiva, moist oral mucosa, nl dentition  Respiratory: good inspiratory effort, lungs CTAB  Cardiovascular: RRR, no M/R/G, 2+ radial/DP pulses, no peripheral edema  GI: soft, nondistended  Skin: no rashes, warm, dry  Neuro: CN 2-12 grossly intact, sensation grossly intact  MSK:  5/5 strength in BLEs, no cyanosis/clubbing  Psych: nl mood/affect/judgment      Medications     lactated ringers 75 mL/hr at 12/30/19 0414       acetaminophen  975 mg Oral Q8H     enoxaparin ANTICOAGULANT  40 mg Subcutaneous Q24H     polyethylene glycol  17 g Oral Daily     senna-docusate  2 tablet Oral BID     sodium chloride (PF)  3 mL Intracatheter Q8H     sodium chloride (PF)  3 mL Intracatheter Q8H       Data   Recent Labs   Lab 12/30/19  0716 12/29/19  0707 12/28/19  1830   WBC  --   --  10.9   HGB 11.4* 13.4 14.2   MCV  --   --  92    245 293   NA  --   --  141   POTASSIUM  --   --  3.8   CHLORIDE  --   --  107   CO2  --   --  30   BUN  --   --  11   CR 0.64 0.54 0.64   ANIONGAP  --   --  4   ABIEL  --   --  9.0   GLC  --   --  124*       Recent Results (from the past 24 hour(s))   XR Pelvis w Hip Port Right 1 View    Narrative    XR PELVIS AD HIP PORTABLE RIGHT 1 VIEW 12/29/2019 3:05 PM     HISTORY: s/p EITAN      Impression    IMPRESSION: Right total hip arthroplasty without apparent  complication. Postoperative soft tissue gas is noted.    SABA ELDER MD

## 2019-12-30 NOTE — PROGRESS NOTES
Orthopedic Surgery  Antoinette Collazo  2019  Admit Date:  2019  POD: 1 Day Post-Op   Procedure(s):  ARTHROPLASTY, RIGHT HIP, TOTAL. Copeland and Nephew.    Alert and orient to person, place, and time.  Hearing impaired.   Patient resting comfortably in bed.    Pain controlled.  Tolerating oral intake.    Denies nausea or vomiting  Denies chest pain or shortness of breath    Vital Sign Ranges  Temperature Temp  Av.4  F (36.9  C)  Min: 97.2  F (36.2  C)  Max: 99.8  F (37.7  C)   Blood pressure Systolic (24hrs), Av , Min:119 , Max:149        Diastolic (24hrs), Av, Min:65, Max:86      Pulse Pulse  Av.5  Min: 60  Max: 96   Respirations Resp  Av.7  Min: 7  Max: 21   Pulse oximetry SpO2  Av.2 %  Min: 92 %  Max: 99 %       Dressing is clean, dry, and intact.   Minimal erythema of the surrounding skin.   Bilateral calves are soft, non-tender.  Right lower extremity is NVI.  Sensation intact bilateral lower extremities  Patient able to resist dorsi and plantar flexion bilaterally  +Dp pulse    Labs:  Recent Labs   Lab Test 19  1830   POTASSIUM 3.8     Recent Labs   Lab Test 19  0716 19  0707 19  1830   HGB 11.4* 13.4 14.2     No results for input(s): INR in the last 93608 hours.  Recent Labs   Lab Test 19  0716 19  0707 19  1830    245 293       1. PLAN:   Continue Lovenox for DVT prophylaxis.     Mobilize with PT/OT    WBAT Right LE.   Posterior hip precautions.      Continue current pain regiment.   Dressings: Keep intact.  Change if >60% saturated   Follow-up: 2 weeks Dr Brito team    2. Disposition   Anticipate d/c to TCU vs home likely in 2 days when medically cleared and progressing in PT.    Ave García PA-C

## 2019-12-30 NOTE — CONSULTS
Care Transition Initial Assessment - SW     Met with: PATIENT    Active Problems:    Hip fracture, right, closed, initial encounter (H)    Closed right hip fracture (H)       DATA  Lives With: spouse   Living Arrangements: house  Quality of Family Relationships: involved  Description of Support System: Supportive, Involved  Who is your support system?: , Children  Support Assessment: Adequate family and caregiver support.   Identified issues/concerns regarding health management: SW following for discharge needs   Quality of Family Relationships: involved  Transportation Anticipated: family or friend will provide(Pt doesn't drive)    ASSESSMENT  Cognitive Status:  Alert and oriented  Concerns to be addressed: patient is a 64 year old female who was admitted to the hospital for right hip fracture. Prior to hospitalization patient was living at with her spouse where she was managing well. Patient had a fall on the ice while walking her dog. Patient is aware that TCU is being recommended. Patient does not have TCU coverage as she has a [employer]. SW will discuss home care options with patient.     SW was also consulted for additional resources. Patient declined resources. Patient has resources that she was aware of in the case she needs them.     PLAN  Financial costs for the patient includes   Patient given options and choices for discharge to TCU  Patient/family is agreeable to the plan?  YES  Transportation/person available to transport on day of discharge  is  and have they been notified/set up   Patient Goals and Preferences: Mary  Patient anticipates discharging to:  TCU    LOR Milian, LGSW  [phone]  [facility]

## 2019-12-30 NOTE — PROGRESS NOTES
12/30/19 5350   Quick Adds   Type of Visit Initial PT Evaluation   Living Environment   Lives With spouse   Living Arrangements house   Home Accessibility stairs to enter home;stairs within home   Number of Stairs, Main Entrance 2   Stair Railings, Main Entrance none   Number of Stairs, Within Home, Primary   (13)   Stair Railings, Within Home, Primary none   Transportation Anticipated family or friend will provide  (Pt doesn't drive)   Living Environment Comment Pt reports her  won't be able to consistently assist upon return home.   Self-Care   Usual Activity Tolerance good   Current Activity Tolerance moderate   Regular Exercise No   Equipment Currently Used at Home none   Functional Level Prior   Ambulation 0-->independent   Transferring 0-->independent   Fall history within last six months yes   Number of times patient has fallen within last six months   (4-6 d/t dog pulling pt on walks)   General Information   Onset of Illness/Injury or Date of Surgery - Date 12/28/19   Referring Physician Tiffanie Wolfe PA-C   Patient/Family Goals Statement Pt is unsure in regards to discharge plans.   Pertinent History of Current Problem (include personal factors and/or comorbidities that impact the POC) 65 y/o female present with displaced R femoral neck fracture, now POD # 1 R EITAN.    Precautions/Limitations fall precautions;right hip precautions  (Posterior-lateral hip precautions)   Weight-Bearing Status - RLE weight-bearing as tolerated   General Observations Pt in supine upon arrival of therapist.    General Info Comments Ambulate with assist.   Cognitive Status Examination   Orientation orientation to person, place and time   Level of Consciousness alert   Follows Commands and Answers Questions 100% of the time   Personal Safety and Judgment intact   Pain Assessment   Patient Currently in Pain   (R hip pain at rest: 4-5/10)   Integumentary/Edema   Integumentary/Edema Comments R hip incision covered  "with dressing.    Posture    Posture Comments Noted forward head and shoulder posture upon sitting EOB and standing at FWW.    Range of Motion (ROM)   ROM Comment Limited R hip ROM secondary to pain and hip precautions, otherwise B LEs WFL.    Strength   Strength Comments Not formally assessed, noted R LE weakness with mobility.   Bed Mobility   Bed Mobility Comments Supine-sit with Haroldo.    Transfer Skills   Transfer Comments Sit <> stand wtih FWW and Haroldo.    Gait   Gait Comments Pt amb 5' with FWW and CGA.    Balance   Balance Comments Noted good seated and standing balance at FWW.   Sensory Examination   Sensory Perception Comments Pt denies numbness/tingling in B LEs and UEs.    Modality Interventions   Planned Modality Interventions Cryotherapy   Planned Modality Interventions Comments PRN.   General Therapy Interventions   Planned Therapy Interventions bed mobility training;gait training;ROM;strengthening;transfer training   Clinical Impression   Criteria for Skilled Therapeutic Intervention yes, treatment indicated   PT Diagnosis Difficulty with gait.    Influenced by the following impairments Pain, Impaired R hip ROM, Decreased strength, Decreased activity tolerance   Functional limitations due to impairments Limited functional mobility requiring AD and assist.    Clinical Presentation Stable/Uncomplicated   Clinical Presentation Rationale Based on PMH, current presentation, and social support.    Clinical Decision Making (Complexity) Low complexity   Therapy Frequency 2x/day   Predicted Duration of Therapy Intervention (days/wks) 3 days   Anticipated Discharge Disposition Transitional Care Facility   Risk & Benefits of therapy have been explained Yes   Patient, Family & other staff in agreement with plan of care Yes   [facility] AM-PAC TM \"6 Clicks\"   2016, Trustees of [facility], under license to [employer].  All rights reserved.   6 Clicks Short Forms Basic Mobility Inpatient Short Form " "  [facility] AM-PAC  \"6 Clicks\" V.2 Basic Mobility Inpatient Short Form   1. Turning from your back to your side while in a flat bed without using bedrails? 3 - A Little   2. Moving from lying on your back to sitting on the side of a flat bed without using bedrails? 3 - A Little   3. Moving to and from a bed to a chair (including a wheelchair)? 3 - A Little   4. Standing up from a chair using your arms (e.g., wheelchair, or bedside chair)? 3 - A Little   5. To walk in hospital room? 3 - A Little   6. Climbing 3-5 steps with a railing? 2 - A Lot   Basic Mobility Raw Score (Score out of 24.Lower scores equate to lower levels of function) 17   Total Evaluation Time   Total Evaluation Time (Minutes) 5     "

## 2019-12-31 ENCOUNTER — APPOINTMENT (OUTPATIENT)
Dept: PHYSICAL THERAPY | Facility: CLINIC | Age: 64
End: 2019-12-31
Payer: COMMERCIAL

## 2019-12-31 ENCOUNTER — APPOINTMENT (OUTPATIENT)
Dept: CT IMAGING | Facility: CLINIC | Age: 64
End: 2019-12-31
Attending: HOSPITALIST
Payer: COMMERCIAL

## 2019-12-31 LAB
ANION GAP SERPL CALCULATED.3IONS-SCNC: 6 MMOL/L (ref 3–14)
BUN SERPL-MCNC: 11 MG/DL (ref 7–30)
CALCIUM SERPL-MCNC: 7.9 MG/DL (ref 8.5–10.1)
CHLORIDE SERPL-SCNC: 102 MMOL/L (ref 94–109)
CO2 SERPL-SCNC: 27 MMOL/L (ref 20–32)
CREAT SERPL-MCNC: 0.67 MG/DL (ref 0.52–1.04)
D DIMER PPP FEU-MCNC: 1.2 UG/ML FEU (ref 0–0.5)
ERYTHROCYTE [DISTWIDTH] IN BLOOD BY AUTOMATED COUNT: 12.9 % (ref 10–15)
GFR SERPL CREATININE-BSD FRML MDRD: >90 ML/MIN/{1.73_M2}
GLUCOSE SERPL-MCNC: 108 MG/DL (ref 70–99)
HCT VFR BLD AUTO: 33 % (ref 35–47)
HGB BLD-MCNC: 10.5 G/DL (ref 11.7–15.7)
LACTATE BLD-SCNC: 0.7 MMOL/L (ref 0.7–2)
MCH RBC QN AUTO: 29.4 PG (ref 26.5–33)
MCHC RBC AUTO-ENTMCNC: 31.8 G/DL (ref 31.5–36.5)
MCV RBC AUTO: 92 FL (ref 78–100)
PLATELET # BLD AUTO: 179 10E9/L (ref 150–450)
POTASSIUM SERPL-SCNC: 3.3 MMOL/L (ref 3.4–5.3)
POTASSIUM SERPL-SCNC: 3.6 MMOL/L (ref 3.4–5.3)
RBC # BLD AUTO: 3.57 10E12/L (ref 3.8–5.2)
SODIUM SERPL-SCNC: 135 MMOL/L (ref 133–144)
WBC # BLD AUTO: 9.5 10E9/L (ref 4–11)

## 2019-12-31 PROCEDURE — 80048 BASIC METABOLIC PNL TOTAL CA: CPT | Performed by: HOSPITALIST

## 2019-12-31 PROCEDURE — 83605 ASSAY OF LACTIC ACID: CPT | Performed by: HOSPITALIST

## 2019-12-31 PROCEDURE — 85027 COMPLETE CBC AUTOMATED: CPT | Performed by: HOSPITALIST

## 2019-12-31 PROCEDURE — 99232 SBSQ HOSP IP/OBS MODERATE 35: CPT | Performed by: HOSPITALIST

## 2019-12-31 PROCEDURE — 25000128 H RX IP 250 OP 636: Performed by: PHYSICIAN ASSISTANT

## 2019-12-31 PROCEDURE — 12000000 ZZH R&B MED SURG/OB

## 2019-12-31 PROCEDURE — 25000128 H RX IP 250 OP 636: Performed by: HOSPITALIST

## 2019-12-31 PROCEDURE — 85379 FIBRIN DEGRADATION QUANT: CPT | Performed by: HOSPITALIST

## 2019-12-31 PROCEDURE — 25000125 ZZHC RX 250: Performed by: HOSPITALIST

## 2019-12-31 PROCEDURE — 25000132 ZZH RX MED GY IP 250 OP 250 PS 637: Performed by: ORTHOPAEDIC SURGERY

## 2019-12-31 PROCEDURE — 25000132 ZZH RX MED GY IP 250 OP 250 PS 637: Performed by: PHYSICIAN ASSISTANT

## 2019-12-31 PROCEDURE — 84132 ASSAY OF SERUM POTASSIUM: CPT | Performed by: HOSPITALIST

## 2019-12-31 PROCEDURE — 36415 COLL VENOUS BLD VENIPUNCTURE: CPT | Performed by: HOSPITALIST

## 2019-12-31 PROCEDURE — 71275 CT ANGIOGRAPHY CHEST: CPT

## 2019-12-31 PROCEDURE — 25000132 ZZH RX MED GY IP 250 OP 250 PS 637: Performed by: HOSPITALIST

## 2019-12-31 PROCEDURE — 97116 GAIT TRAINING THERAPY: CPT | Mod: GP

## 2019-12-31 PROCEDURE — 97110 THERAPEUTIC EXERCISES: CPT | Mod: GP

## 2019-12-31 RX ORDER — POTASSIUM CHLORIDE 1500 MG/1
20-40 TABLET, EXTENDED RELEASE ORAL
Status: DISCONTINUED | OUTPATIENT
Start: 2019-12-31 | End: 2020-01-02 | Stop reason: HOSPADM

## 2019-12-31 RX ORDER — POTASSIUM CHLORIDE 1.5 G/1.58G
20-40 POWDER, FOR SOLUTION ORAL
Status: DISCONTINUED | OUTPATIENT
Start: 2019-12-31 | End: 2019-12-31

## 2019-12-31 RX ORDER — POTASSIUM CHLORIDE 1500 MG/1
20-40 TABLET, EXTENDED RELEASE ORAL
Status: DISCONTINUED | OUTPATIENT
Start: 2019-12-31 | End: 2019-12-31

## 2019-12-31 RX ORDER — POTASSIUM CHLORIDE 7.45 MG/ML
10 INJECTION INTRAVENOUS
Status: DISCONTINUED | OUTPATIENT
Start: 2019-12-31 | End: 2019-12-31

## 2019-12-31 RX ORDER — POTASSIUM CHLORIDE 29.8 MG/ML
20 INJECTION INTRAVENOUS
Status: DISCONTINUED | OUTPATIENT
Start: 2019-12-31 | End: 2019-12-31

## 2019-12-31 RX ORDER — POTASSIUM CHLORIDE 1.5 G/1.58G
20-40 POWDER, FOR SOLUTION ORAL
Status: DISCONTINUED | OUTPATIENT
Start: 2019-12-31 | End: 2020-01-02 | Stop reason: HOSPADM

## 2019-12-31 RX ORDER — POTASSIUM CL/LIDO/0.9 % NACL 10MEQ/0.1L
10 INTRAVENOUS SOLUTION, PIGGYBACK (ML) INTRAVENOUS
Status: DISCONTINUED | OUTPATIENT
Start: 2019-12-31 | End: 2020-01-02 | Stop reason: HOSPADM

## 2019-12-31 RX ORDER — POTASSIUM CL/LIDO/0.9 % NACL 10MEQ/0.1L
10 INTRAVENOUS SOLUTION, PIGGYBACK (ML) INTRAVENOUS
Status: DISCONTINUED | OUTPATIENT
Start: 2019-12-31 | End: 2019-12-31

## 2019-12-31 RX ORDER — MAGNESIUM SULFATE HEPTAHYDRATE 40 MG/ML
4 INJECTION, SOLUTION INTRAVENOUS EVERY 4 HOURS PRN
Status: DISCONTINUED | OUTPATIENT
Start: 2019-12-31 | End: 2020-01-02 | Stop reason: HOSPADM

## 2019-12-31 RX ORDER — IOPAMIDOL 755 MG/ML
56 INJECTION, SOLUTION INTRAVASCULAR ONCE
Status: COMPLETED | OUTPATIENT
Start: 2019-12-31 | End: 2019-12-31

## 2019-12-31 RX ORDER — POTASSIUM CHLORIDE 7.45 MG/ML
10 INJECTION INTRAVENOUS
Status: DISCONTINUED | OUTPATIENT
Start: 2019-12-31 | End: 2020-01-02 | Stop reason: HOSPADM

## 2019-12-31 RX ADMIN — OXYCODONE HYDROCHLORIDE 5 MG: 5 TABLET ORAL at 12:10

## 2019-12-31 RX ADMIN — ONDANSETRON 4 MG: 4 TABLET, ORALLY DISINTEGRATING ORAL at 00:28

## 2019-12-31 RX ADMIN — POTASSIUM CHLORIDE 20 MEQ: 1500 TABLET, EXTENDED RELEASE ORAL at 12:12

## 2019-12-31 RX ADMIN — SENNOSIDES AND DOCUSATE SODIUM 2 TABLET: 8.6; 5 TABLET ORAL at 20:04

## 2019-12-31 RX ADMIN — HYDROXYZINE HYDROCHLORIDE 25 MG: 25 TABLET, FILM COATED ORAL at 16:27

## 2019-12-31 RX ADMIN — ENOXAPARIN SODIUM 40 MG: 40 INJECTION SUBCUTANEOUS at 08:44

## 2019-12-31 RX ADMIN — POTASSIUM CHLORIDE 40 MEQ: 1500 TABLET, EXTENDED RELEASE ORAL at 08:44

## 2019-12-31 RX ADMIN — IOPAMIDOL 56 ML: 755 INJECTION, SOLUTION INTRAVENOUS at 07:24

## 2019-12-31 RX ADMIN — SENNOSIDES AND DOCUSATE SODIUM 2 TABLET: 8.6; 5 TABLET ORAL at 08:44

## 2019-12-31 RX ADMIN — ACETAMINOPHEN 975 MG: 325 TABLET, FILM COATED ORAL at 12:08

## 2019-12-31 RX ADMIN — OXYCODONE HYDROCHLORIDE 5 MG: 5 TABLET ORAL at 20:06

## 2019-12-31 RX ADMIN — ACETAMINOPHEN 975 MG: 325 TABLET, FILM COATED ORAL at 04:24

## 2019-12-31 RX ADMIN — HYDROMORPHONE HYDROCHLORIDE 0.5 MG: 1 INJECTION, SOLUTION INTRAMUSCULAR; INTRAVENOUS; SUBCUTANEOUS at 07:00

## 2019-12-31 RX ADMIN — OXYCODONE HYDROCHLORIDE 10 MG: 5 TABLET ORAL at 04:24

## 2019-12-31 RX ADMIN — HYDROXYZINE HYDROCHLORIDE 25 MG: 25 TABLET, FILM COATED ORAL at 00:27

## 2019-12-31 RX ADMIN — OXYCODONE HYDROCHLORIDE 5 MG: 5 TABLET ORAL at 16:27

## 2019-12-31 RX ADMIN — SODIUM CHLORIDE 83 ML: 9 INJECTION, SOLUTION INTRAVENOUS at 07:25

## 2019-12-31 RX ADMIN — OXYCODONE HYDROCHLORIDE 10 MG: 5 TABLET ORAL at 00:27

## 2019-12-31 RX ADMIN — ACETAMINOPHEN 975 MG: 325 TABLET, FILM COATED ORAL at 20:04

## 2019-12-31 ASSESSMENT — ACTIVITIES OF DAILY LIVING (ADL)
ADLS_ACUITY_SCORE: 14

## 2019-12-31 NOTE — PROGRESS NOTES
[facility]  Hospitalist Progress Note        Luis Pisano MD   12/31/2019        Interval History:      - noted with postop fever tachycardia and hypoxia; clinically better this am and denies any active complaints          Assessment and Plan:        Ms. Bradly Collazo is a 64-year-old pleasant female with no significant past medical history, who presented to ED following a mechanical fall, resulting in right femoral neck fracture.     # Right femoral neck fracture following a mechanical fall; status post right total hip arthroplasty 12/29/19  # postop blood loss anemia  - Hemoglobin 14---11---10.5  - evaluated by physical therapy-- recommend TCU; consult SW for disposition planning  - orthopedics following    # Postop fever  # Post op hypoxia  - postop Tmax 102, was also SOB and hypoxic with elevated dimer; nl wbc/lactate; tachycardia - CT PE was done which showed no PE, did show some bibasilar consolidative pulmonary opacities, likely atelectasis  -clinically better and off oxygen  - encourage IS  -monitor off antibiotics    # Incidental lung nodule, 7 mm  -outpatient follow-up with PCP with CT in 6 to 12 months    # Urinary retention:   -  Likely due to opiates ; Ochoa placed overnight on 12/29  - will remove ochoa and do voiding trial    # Hypokalemia  - K 3.3; will start supple protocol    #  Deep venous thrombosis prophylaxis:  Lovenox     CODE STATUS:  Full code.     Disposition: likely 1 to 2 days if clinically remains stable and afebrile, plan for TCU;  for disposition                     Physical Exam:      Blood pressure 115/67, pulse 98, temperature 99.5  F (37.5  C), temperature source Oral, resp. rate 20, SpO2 97 %.  There were no vitals filed for this visit.  Vital Signs with Ranges  Temp:  [98.2  F (36.8  C)-102.6  F (39.2  C)] 99.5  F (37.5  C)  Pulse:  [] 98  Heart Rate:  [] 120  Resp:  [14-24] 20  BP: (100-150)/(59-82) 115/67  SpO2:  [85 %-98 %] 97  %  I/O's Last 24 hours  I/O last 3 completed shifts:  In: -   Out: 1450 [Urine:1450]    Constitutional: Alert, awake and oriented X 3; lying comfortably in bed in no apparent distress   HEENT: Pupils equal and reactive to light and accomodation, EOMI intact; neck supple no raised JVD or rigidity    Oral cavity: Moist mucosa   Cardiovascular: Normal s1 s2, regular rate and rhythm, no murmur   Lungs: B/l clear to auscultation, no wheezes or crepitations   Abdomen: Soft, nt, nd, no guarding, rigidity or rebound; BS +   LE : No edema   Musculoskeletal: Power 5/5 in all extremities   Neuro: No focal neurological deficits noted, CN II to XII grossly intact   Psychiatry: normal mood and affect                Medications:          acetaminophen  975 mg Oral Q8H     enoxaparin ANTICOAGULANT  40 mg Subcutaneous Q24H     polyethylene glycol  17 g Oral Daily     senna-docusate  2 tablet Oral BID     sodium chloride (PF)  3 mL Intracatheter Q8H     PRN Meds: [START ON 1/1/2020] acetaminophen, sore throat lozenge, bisacodyl, HYDROmorphone, hydrOXYzine, lidocaine 4%, lidocaine (buffered or not buffered), magnesium hydroxide, magnesium sulfate, melatonin, naloxone, ondansetron **OR** ondansetron, oxyCODONE IR, potassium chloride, potassium chloride with lidocaine, potassium chloride, potassium chloride, potassium chloride, prochlorperazine **OR** prochlorperazine **OR** prochlorperazine, senna-docusate **OR** senna-docusate         Data:      All new lab and imaging data was reviewed.   Recent Labs   Lab Test 12/31/19  0516 12/30/19  0716 12/29/19  0707 12/28/19  1830   WBC 9.5  --   --  10.9   HGB 10.5* 11.4* 13.4 14.2   MCV 92  --   --  92    208 245 293      Recent Labs   Lab Test 12/31/19  0516 12/30/19  0716 12/29/19  0707 12/28/19  1830     --   --  141   POTASSIUM 3.3*  --   --  3.8   CHLORIDE 102  --   --  107   CO2 27  --   --  30   BUN 11  --   --  11   CR 0.67 0.64 0.54 0.64   ANIONGAP 6  --   --  4   ABIEL  7.9*  --   --  9.0   *  --   --  124*     No lab results found.    Invalid input(s): TROP, TROPONINIES

## 2019-12-31 NOTE — OP NOTE
Procedure Date: 12/29/2019      PREOPERATIVE DIAGNOSIS:  Right displaced femoral neck fracture.      POSTOPERATIVE DIAGNOSIS:  Right displaced femoral neck fracture.      PROCEDURE:  Right total hip arthroplasty.      SURGEON:  Chace Brito MD      FIRST ASSISTANT:  ZENON Ramos.  A skilled first assistant was necessary for patient positioning, prepping and draping, help with soft tissue retraction, help with leg positioning, helping with reduction maneuvers, closing and patient transfer.      ANESTHESIA:  General and local.      ESTIMATED BLOOD LOSS:  150 mL.      COMPLICATIONS:  None apparent.      IMPLANTS:   1.  [employer] Anthology standard offset stem size 6.   2.  Smith and Nephew Oxinium head with a size 36 with +0 neck.   3.  [employer]eland [employer]hew acetabular shell, size 52, with an R3 3-hole shell.   4.  Smith and Nephew liner with a 20 degree wedge.      INDICATIONS:  The patient is a 64-year-old female who was walking her dog on 12/28/2019 when she slipped on the ice and fell.  She landed on her right hip and had immediate pain and inability to bear weight.  She was brought to the ER and found to have a displaced femoral neck fracture and she was admitted to the Hospitalist Service and Orthopedics was consulted.  After discussion of treatment options with the patient, we decided the best treatment forward would be a total hip arthroplasty given the patient's young age and activity status,  She agreed to proceed.      DESCRIPTION OF PROCEDURE:  On the date of the procedure, the patient was met in the preoperative area by the surgery and anesthesia teams.  The right hip was marked by the operative surgeon.  Informed consent was obtained.  Risks and benefits of the surgery were discussed with the patient including risk of bleeding, infection, damage to neurovascular structures, stiffness, pain, dislocation, blood clots and need for future revision surgery.  She understood and agreed to proceed.  She  was then brought to the operating room, placed on the operating room table in supine position and general anesthesia was administered.  She was then flipped into the lateral decubitus position and the right hip was prepped and draped in the usual sterile fashion.  Preoperative antibiotics were given.  Preoperative timeout was performed.  We began the procedure by making a standard incision centered over the greater trochanter.  Sharp dissection was carried down through the skin and the subcutaneous tissue.  The IT band and the gluteus lisa fibers were split along its decussating fibers and a Charnley retractor was placed.  We then proceeded with a posterior approach.  The bursa over the gluteus medius was removed and the short external rotators and the capsule were cut off the femur in an L-shaped capsulotomy and tagged with a piriformis sparing approach and spared the piriformis.  We then freshened up the cut of the femoral neck and removed the femoral head.  This was then sized on the back table.  Next, we placed our appropriate retractors and for visualization of the acetabulum we removed the pulvinar.  The labrum was removed as well.  We then proceeded with reaming, starting with a size 42 and reamed up all the way to a 52.  The patient did have a little bit of retroversion.  We tried to medialize the cup significantly in order to get a nice coverage of the actual shell.  We were pleased with the size 52 reamer and then attempted to impact the true implant after trialing; however, it was being held up by some soft tissues and we were unable to get it completely flush down medially.  Therefore, we started reaming once again and medialized a little more, starting with a size 48 in order to get better hemispherical coverage.  We then impacted in the cup in approximately 50 degrees of vertical inclination and about 30 degrees of anteversion, getting an excellent bite at this time.  Two screws were then also  placed for added stability and a 20 degree offset liner was placed with the liner facing posterior to add stability.  Next, we turned our attention to the proximal femur and started broaching the proximal femur after using a canal finder to get into the canal.  We used the Billdesk system and broached up to a size 6.  We then trialed a standard offset neck and a +0 head and this gave us excellent leg length as well as great stability.  We were able to flex the hip up to 90 degrees and about 80 degrees of internal rotation prior to any subluxation.  We were quite pleased with the overall stability of the hip.  The trial implants were then removed.  The wound was copiously irrigated and true implants were impacted into position.  We retrialed once again with the head and again a +0 head was appropriate; therefore, a +0 Oxinium head was impacted into position and the true hip was reduced.  The wound was copiously irrigated and we turned our attention to closure.  The short external rotators and the capsule were reapproximated back to the greater trochanter through bone tunnels and then resutured back to the proximal capsule/piriformis to allow for adequate stability.  We then injected a local cocktail into the gluteus medius, short external rotators capsule, gluteus lisa as well as the IT band and the vastus lateralis for pain control.  The IT band was then closed using #1 suture followed by 2-0 for deep dermals and a running 4-0 Monocryl for skin.  Sterile dressing was applied.  The patient was then placed into a hip abduction pillow, awakened from anesthesia and brought to the PACU for recovery.      Postoperatively, she will be weightbearing as tolerated.  She will proceed with posterior hip precautions and we will start aspirin for DVT prophylaxis.         TRISH HURD MD             D: 12/30/2019   T: 12/30/2019   MT: MELONIE      Name:     BRIONNA GREEN   MRN:      [id number]        Account:         [id number]   :      1955           Procedure Date: 2019      Document: [id number]

## 2019-12-31 NOTE — PROGRESS NOTES
Cross Cover:  Notified that patient had fever 102.6 (uptrending since midnight), also was MARADIAGA with tachycardia, tachypnea with O2 sat 85% on room air requiring supplemental O2.  AM labs drawn early, d-dimer added.    Low potassium-protocol added  Check CT PE study considering recent orthopedic intervention and sudden onset of symptoms. Will also give us an idea of any lung disease present to rule out infectious cause    Discussed with nursing    AL Chance, DO

## 2019-12-31 NOTE — PROGRESS NOTES
Orthopedic Surgery  Antoinette Collazo  2019  Admit Date:  2019  POD: 2 Days Post-Op   Procedure(s):  ARTHROPLASTY, RIGHT HIP, TOTAL. [employer]hew.    Alert and orient to person, place, and time.  Patient resting comfortably in chair.    Having some increased pain today.   Tolerating oral intake.    Denies nausea or vomiting  Denies chest pain or shortness of breath    Vital Sign Ranges  Temperature Temp  Av.5  F (38.1  C)  Min: 98.6  F (37  C)  Max: 102.6  F (39.2  C)   Blood pressure Systolic (24hrs), Av , Min:87 , Max:150        Diastolic (24hrs), Av, Min:49, Max:82      Pulse Pulse  Av  Min: 98  Max: 122   Respirations Resp  Av.7  Min: 14  Max: 24   Pulse oximetry SpO2  Av.3 %  Min: 85 %  Max: 98 %       Dressing is clean, dry, and intact.   Minimal erythema of the surrounding skin.   Bilateral calves are soft, non-tender.  Right lower extremity is NVI.  Sensation intact bilateral lower extremities  Patient able to resist dorsi and plantar flexion bilaterally  +Dp pulse    Labs:  Recent Labs   Lab Test 19  0516 19  1830   POTASSIUM 3.3* 3.8     Recent Labs   Lab Test 19  0516 19  0716 19  0707   HGB 10.5* 11.4* 13.4     No results for input(s): INR in the last 91082 hours.  Recent Labs   Lab Test 19  0516 19  0716 19  0707    208 245       1. PLAN:   Continue Lovenox for DVT prophylaxis.     Mobilize with PT/OT    WBAT right LE.     Continue current pain regiment.   Dressings: Keep intact.  Change if >60% saturated   Follow-up: 2 weeks Dr Brito team    2. Disposition   Anticipate d/c to TCU likely 1-2 days when medically cleared and progressing in PT.    Ave García PA-C

## 2020-01-01 ENCOUNTER — APPOINTMENT (OUTPATIENT)
Dept: PHYSICAL THERAPY | Facility: CLINIC | Age: 65
End: 2020-01-01
Payer: COMMERCIAL

## 2020-01-01 LAB
ANION GAP SERPL CALCULATED.3IONS-SCNC: 4 MMOL/L (ref 3–14)
BUN SERPL-MCNC: 8 MG/DL (ref 7–30)
CALCIUM SERPL-MCNC: 8.2 MG/DL (ref 8.5–10.1)
CHLORIDE SERPL-SCNC: 103 MMOL/L (ref 94–109)
CO2 SERPL-SCNC: 30 MMOL/L (ref 20–32)
CREAT SERPL-MCNC: 0.64 MG/DL (ref 0.52–1.04)
ERYTHROCYTE [DISTWIDTH] IN BLOOD BY AUTOMATED COUNT: 13 % (ref 10–15)
GFR SERPL CREATININE-BSD FRML MDRD: >90 ML/MIN/{1.73_M2}
GLUCOSE SERPL-MCNC: 97 MG/DL (ref 70–99)
HCT VFR BLD AUTO: 32 % (ref 35–47)
HGB BLD-MCNC: 10.3 G/DL (ref 11.7–15.7)
MCH RBC QN AUTO: 30 PG (ref 26.5–33)
MCHC RBC AUTO-ENTMCNC: 32.2 G/DL (ref 31.5–36.5)
MCV RBC AUTO: 93 FL (ref 78–100)
PLATELET # BLD AUTO: 192 10E9/L (ref 150–450)
POTASSIUM SERPL-SCNC: 3.9 MMOL/L (ref 3.4–5.3)
RBC # BLD AUTO: 3.43 10E12/L (ref 3.8–5.2)
SODIUM SERPL-SCNC: 137 MMOL/L (ref 133–144)
WBC # BLD AUTO: 8.5 10E9/L (ref 4–11)

## 2020-01-01 PROCEDURE — 25000132 ZZH RX MED GY IP 250 OP 250 PS 637: Performed by: INTERNAL MEDICINE

## 2020-01-01 PROCEDURE — 97110 THERAPEUTIC EXERCISES: CPT | Mod: GP | Performed by: PHYSICAL THERAPIST

## 2020-01-01 PROCEDURE — 25000132 ZZH RX MED GY IP 250 OP 250 PS 637: Performed by: HOSPITALIST

## 2020-01-01 PROCEDURE — 85027 COMPLETE CBC AUTOMATED: CPT | Performed by: HOSPITALIST

## 2020-01-01 PROCEDURE — 99231 SBSQ HOSP IP/OBS SF/LOW 25: CPT | Performed by: INTERNAL MEDICINE

## 2020-01-01 PROCEDURE — 80048 BASIC METABOLIC PNL TOTAL CA: CPT | Performed by: HOSPITALIST

## 2020-01-01 PROCEDURE — 36415 COLL VENOUS BLD VENIPUNCTURE: CPT | Performed by: HOSPITALIST

## 2020-01-01 PROCEDURE — 12000000 ZZH R&B MED SURG/OB

## 2020-01-01 PROCEDURE — 25000128 H RX IP 250 OP 636: Performed by: PHYSICIAN ASSISTANT

## 2020-01-01 PROCEDURE — 97530 THERAPEUTIC ACTIVITIES: CPT | Mod: GP | Performed by: PHYSICAL THERAPIST

## 2020-01-01 PROCEDURE — 99207 ZZC MOONLIGHTING INDICATOR: CPT | Performed by: INTERNAL MEDICINE

## 2020-01-01 PROCEDURE — 97116 GAIT TRAINING THERAPY: CPT | Mod: GP | Performed by: PHYSICAL THERAPIST

## 2020-01-01 PROCEDURE — 25000132 ZZH RX MED GY IP 250 OP 250 PS 637: Performed by: PHYSICIAN ASSISTANT

## 2020-01-01 RX ORDER — BISACODYL 5 MG
5 TABLET, DELAYED RELEASE (ENTERIC COATED) ORAL DAILY PRN
Status: DISCONTINUED | OUTPATIENT
Start: 2020-01-01 | End: 2020-01-02 | Stop reason: HOSPADM

## 2020-01-01 RX ADMIN — SENNOSIDES AND DOCUSATE SODIUM 2 TABLET: 8.6; 5 TABLET ORAL at 09:10

## 2020-01-01 RX ADMIN — OXYCODONE HYDROCHLORIDE 5 MG: 5 TABLET ORAL at 03:53

## 2020-01-01 RX ADMIN — BISACODYL 5 MG: 5 TABLET, COATED ORAL at 14:39

## 2020-01-01 RX ADMIN — OXYCODONE HYDROCHLORIDE 10 MG: 5 TABLET ORAL at 12:22

## 2020-01-01 RX ADMIN — OXYCODONE HYDROCHLORIDE 5 MG: 5 TABLET ORAL at 08:21

## 2020-01-01 RX ADMIN — SENNOSIDES AND DOCUSATE SODIUM 1 TABLET: 8.6; 5 TABLET ORAL at 20:30

## 2020-01-01 RX ADMIN — OXYCODONE HYDROCHLORIDE 10 MG: 5 TABLET ORAL at 18:47

## 2020-01-01 RX ADMIN — ENOXAPARIN SODIUM 40 MG: 40 INJECTION SUBCUTANEOUS at 09:08

## 2020-01-01 RX ADMIN — ACETAMINOPHEN 975 MG: 325 TABLET, FILM COATED ORAL at 12:18

## 2020-01-01 RX ADMIN — ONDANSETRON 4 MG: 4 TABLET, ORALLY DISINTEGRATING ORAL at 09:00

## 2020-01-01 RX ADMIN — ACETAMINOPHEN 975 MG: 325 TABLET, FILM COATED ORAL at 03:53

## 2020-01-01 ASSESSMENT — ACTIVITIES OF DAILY LIVING (ADL)
ADLS_ACUITY_SCORE: 14

## 2020-01-01 NOTE — CONSULTS
Care Transition Initial Assessment - SW     Met with: Patient and spouse  Active Problems:    Hip fracture, right, closed, initial encounter (H)    Closed right hip fracture (H)       DATA  Lives With: spouse   Living Arrangements: house  Quality of Family Relationships: involved  Description of Support System: Supportive, Involved  Who is your support system?: , Children  Support Assessment: Adequate family and caregiver support. Unknown  Identified issues/concerns regarding health management:  feels that he cannot provide what pt needs at home and cannot afford TCU     Quality of Family Relationships: involved  Transportation Anticipated: family or friend will provide(Pt doesn't drive)    ASSESSMENT  Cognitive Status:  Alert and oriented.  Concerns to be addressed: None   PLAN  Financial costs for the patient includes Private pay.  Patient given options and choices for discharge yes .  Patient/family is agreeable to the plan?  Family feels that they cannot afford TCU and cannot manage at home either.   Transportation/person available to transport on day of discharge  is  TBD  Patient Goals and Preferences: Would like TCU but cannot afford private pay.  Patient anticipates discharging to:  TBD .    SW spoke to pt and  about private pay for TCU.   states they cannot afford it and have no savings and have debt and that he makes less per day than the approximately $300 per day that he was told rehab costs.  SW gave TCU list for reference.

## 2020-01-01 NOTE — PROGRESS NOTES
[facility]  Hospitalist Progress Note for 1/1/2020:          Assessment and Plan:   Ms. Bradly Collazo is a 64-year-old pleasant female with no significant past medical history, who presented to ED following a mechanical fall, resulting in right femoral neck fracture.       Right femoral neck fracture following a mechanical fall; status post right total hip arthroplasty 12/29/19   postop blood loss anemia  - Hemoglobin 14---11---10.5-> 10.3 today  - evaluated by physical therapy & recommening TCU at discharge but has insurance problems for coverage. [facility] consulted for disposition planning.  - orthopedics following      Postop fever   Post op hypoxia  - postop Tmax 102.SOB, hypoxic with elevated dimer; Normal WBC, lactic acid. CT PE was done which showed no PE, did show some bibasilar consolidative pulmonary opacities, likely atelectasis  - clinically better and off oxygen, temp improving  - encourage IS  - continue to monitor off antibiotics     Acute blood loss anemia:  preop hgb 14.2-> 11.4-> 10.5-> 10.3 today  - consider po ferrous gluconate at discharge or when constipation improves      Constipation:  - pt does not like to take liq med.  - continue senna- docusate   - add po dulcolax, prunes, increase po fluids     Incidental lung nodule, 7 mm  -outpatient follow-up with PCP with CT in 6 to 12 months      Urinary retention:   -  Likely due to opiates ; Ochoa placed overnight on 12/29  - will remove ochoa and do voiding trial      Hypokalemia  - K 3.3; will start supple protocol      DVT prophylaxis: on  Lovenox     CODE STATUS:  Full code.      Disposition:  When safe disposition in place. Currently therapy recommening TCU at discharge but has insurance problems for coverage. [facility] consulted for disposition planning.    Ofelia Dewey MD.  Hospitalist [phone] (7am -6 pm)               Interval History:   C/o constipation. No BM x 1 week, does not like taking liq medication.               Medications:       enoxaparin ANTICOAGULANT  40 mg Subcutaneous Q24H     magnesium hydroxide  30 mL Oral Once     polyethylene glycol  17 g Oral Daily     senna-docusate  2 tablet Oral BID     sodium chloride (PF)  3 mL Intracatheter Q8H     acetaminophen, sore throat lozenge, bisacodyl, HYDROmorphone, hydrOXYzine, lidocaine 4%, lidocaine (buffered or not buffered), magnesium hydroxide, magnesium sulfate, melatonin, naloxone, ondansetron **OR** ondansetron, oxyCODONE IR, potassium chloride, potassium chloride with lidocaine, potassium chloride, potassium chloride, prochlorperazine **OR** prochlorperazine **OR** prochlorperazine, senna-docusate **OR** senna-docusate               Physical Exam:   Blood pressure 107/64, pulse 98, temperature 98.6  F (37  C), temperature source Oral, resp. rate 16, weight 60.2 kg (132 lb 11.2 oz), SpO2 90 %.  Wt Readings from Last 4 Encounters:   20 60.2 kg (132 lb 11.2 oz)   09 56.7 kg (125 lb)         Vital Sign Ranges  Temperature Temp  Av.9  F (37.2  C)  Min: 98.4  F (36.9  C)  Max: 99.5  F (37.5  C)   Blood pressure Systolic (24hrs), Av , Min:101 , Max:120        Diastolic (24hrs), Av, Min:62, Max:71      Pulse No data recorded   Respirations Resp  Av.9  Min: 16  Max: 20   Pulse oximetry SpO2  Av.4 %  Min: 88 %  Max: 94 %         Intake/Output Summary (Last 24 hours) at 2020 1402  Last data filed at 2019 1949  Gross per 24 hour   Intake 200 ml   Output 200 ml   Net 0 ml       Constitutional: Anxious awake, alert, cooperative, no apparent distress   Lungs: Clear to auscultation bilaterally, no crackles or wheezing   Cardiovascular: Regular rate and rhythm, normal S1 and S2, and no murmur noted   Abdomen: Normal bowel sounds, soft, non-distended, non-tender   Skin: No rashes, no cyanosis, no edema   Neuro:                Data:   All laboratory data reviewed

## 2020-01-01 NOTE — PROGRESS NOTES
Orthopedic Surgery  Antoinette Collazo  2020  Admit Date:  2019  POD: 3 Days Post-Op  Procedure(s):  ARTHROPLASTY, RIGHT HIP, TOTAL. [employer] Nephew.    Patient resting comfortably in bed.    Pain controlled.  Tolerating oral intake.    Denies nausea or vomiting.  Denies chest pain or shortness of breath.  No events overnight.      Alert and orient to person, place, and time.  Vital Sign Ranges  Temperature Temp  Av  F (37.2  C)  Min: 98.4  F (36.9  C)  Max: 99.5  F (37.5  C)   Blood pressure Systolic (24hrs), Av , Min:87 , Max:120        Diastolic (24hrs), Av, Min:49, Max:71      Pulse No data recorded   Respirations Resp  Av.7  Min: 16  Max: 20   Pulse oximetry SpO2  Av %  Min: 88 %  Max: 95 %       Dressing is clean, dry, and intact.   Minimal erythema of the surrounding skin.   Bilateral calves are soft, non-tender.   Bilateral lower extremity is NVI.  Sensation intact bilateral lower extremities.  5/5 motor with resisted dorsi and plantar flexion bilaterally.  +DP pulse.     Labs:  Recent Labs   Lab Test 20  0704 19  1620 19  0516   POTASSIUM 3.9 3.6 3.3*     Recent Labs   Lab Test 20  0704 19  0516 19  0716   HGB 10.3* 10.5* 11.4*     No results for input(s): INR in the last 39663 hours.  Recent Labs   Lab Test 20  0704 19  0516 19  0716    179 208       A/P  1. Plan   Continue Lovenox for DVT prophylaxis.                Mobilize with PT/OT               WBAT               Posterior hip precautions.                 Continue current pain regiment.              Leave dressing intact               Follow up with Larisa Wolfe PA-C in clinic in 2 weeks.       2. Disposition              Anticipate d/c to TCU vs home likely in 2 days when medically cleared and progressing in PT.    Tiffanie Wolfe PA-C

## 2020-01-02 ENCOUNTER — APPOINTMENT (OUTPATIENT)
Dept: OCCUPATIONAL THERAPY | Facility: CLINIC | Age: 65
End: 2020-01-02
Payer: COMMERCIAL

## 2020-01-02 ENCOUNTER — APPOINTMENT (OUTPATIENT)
Dept: PHYSICAL THERAPY | Facility: CLINIC | Age: 65
End: 2020-01-02
Payer: COMMERCIAL

## 2020-01-02 VITALS
DIASTOLIC BLOOD PRESSURE: 64 MMHG | TEMPERATURE: 99 F | WEIGHT: 132.7 LBS | SYSTOLIC BLOOD PRESSURE: 118 MMHG | HEART RATE: 98 BPM | OXYGEN SATURATION: 93 % | RESPIRATION RATE: 16 BRPM

## 2020-01-02 LAB
BASOPHILS # BLD AUTO: 0 10E9/L (ref 0–0.2)
BASOPHILS NFR BLD AUTO: 0.4 %
CREAT SERPL-MCNC: 0.67 MG/DL (ref 0.52–1.04)
DIFFERENTIAL METHOD BLD: ABNORMAL
EOSINOPHIL # BLD AUTO: 0.2 10E9/L (ref 0–0.7)
EOSINOPHIL NFR BLD AUTO: 1.5 %
ERYTHROCYTE [DISTWIDTH] IN BLOOD BY AUTOMATED COUNT: 12.9 % (ref 10–15)
GFR SERPL CREATININE-BSD FRML MDRD: >90 ML/MIN/{1.73_M2}
HCT VFR BLD AUTO: 34.7 % (ref 35–47)
HGB BLD-MCNC: 11.1 G/DL (ref 11.7–15.7)
IMM GRANULOCYTES # BLD: 0 10E9/L (ref 0–0.4)
IMM GRANULOCYTES NFR BLD: 0.3 %
LYMPHOCYTES # BLD AUTO: 2.1 10E9/L (ref 0.8–5.3)
LYMPHOCYTES NFR BLD AUTO: 21.2 %
MCH RBC QN AUTO: 29.6 PG (ref 26.5–33)
MCHC RBC AUTO-ENTMCNC: 32 G/DL (ref 31.5–36.5)
MCV RBC AUTO: 93 FL (ref 78–100)
MONOCYTES # BLD AUTO: 0.9 10E9/L (ref 0–1.3)
MONOCYTES NFR BLD AUTO: 8.6 %
NEUTROPHILS # BLD AUTO: 6.9 10E9/L (ref 1.6–8.3)
NEUTROPHILS NFR BLD AUTO: 68 %
NRBC # BLD AUTO: 0 10*3/UL
NRBC BLD AUTO-RTO: 0 /100
PLATELET # BLD AUTO: 258 10E9/L (ref 150–450)
PLATELET # BLD AUTO: 290 10E9/L (ref 150–450)
PROCALCITONIN SERPL-MCNC: 0.12 NG/ML
RBC # BLD AUTO: 3.75 10E12/L (ref 3.8–5.2)
WBC # BLD AUTO: 10.1 10E9/L (ref 4–11)

## 2020-01-02 PROCEDURE — 25000128 H RX IP 250 OP 636: Performed by: PHYSICIAN ASSISTANT

## 2020-01-02 PROCEDURE — 25000132 ZZH RX MED GY IP 250 OP 250 PS 637: Performed by: PHYSICIAN ASSISTANT

## 2020-01-02 PROCEDURE — 97165 OT EVAL LOW COMPLEX 30 MIN: CPT | Mod: GO

## 2020-01-02 PROCEDURE — 97116 GAIT TRAINING THERAPY: CPT | Mod: GP

## 2020-01-02 PROCEDURE — 97530 THERAPEUTIC ACTIVITIES: CPT | Mod: GP

## 2020-01-02 PROCEDURE — 85025 COMPLETE CBC W/AUTO DIFF WBC: CPT | Performed by: INTERNAL MEDICINE

## 2020-01-02 PROCEDURE — 97530 THERAPEUTIC ACTIVITIES: CPT | Mod: GO

## 2020-01-02 PROCEDURE — 97535 SELF CARE MNGMENT TRAINING: CPT | Mod: GO

## 2020-01-02 PROCEDURE — 36415 COLL VENOUS BLD VENIPUNCTURE: CPT | Performed by: INTERNAL MEDICINE

## 2020-01-02 PROCEDURE — 99207 ZZC MOONLIGHTING INDICATOR: CPT | Performed by: INTERNAL MEDICINE

## 2020-01-02 PROCEDURE — 97110 THERAPEUTIC EXERCISES: CPT | Mod: GP

## 2020-01-02 PROCEDURE — 36415 COLL VENOUS BLD VENIPUNCTURE: CPT | Performed by: HOSPITALIST

## 2020-01-02 PROCEDURE — 99239 HOSP IP/OBS DSCHRG MGMT >30: CPT | Performed by: INTERNAL MEDICINE

## 2020-01-02 PROCEDURE — 84145 PROCALCITONIN (PCT): CPT | Performed by: INTERNAL MEDICINE

## 2020-01-02 PROCEDURE — 85049 AUTOMATED PLATELET COUNT: CPT | Performed by: HOSPITALIST

## 2020-01-02 PROCEDURE — 82565 ASSAY OF CREATININE: CPT | Performed by: HOSPITALIST

## 2020-01-02 RX ORDER — BISACODYL 5 MG
5 TABLET, DELAYED RELEASE (ENTERIC COATED) ORAL DAILY PRN
Qty: 20 TABLET | Refills: 0 | Status: SHIPPED | OUTPATIENT
Start: 2020-01-02 | End: 2022-06-14

## 2020-01-02 RX ADMIN — ACETAMINOPHEN 650 MG: 325 TABLET, FILM COATED ORAL at 11:58

## 2020-01-02 RX ADMIN — ENOXAPARIN SODIUM 40 MG: 40 INJECTION SUBCUTANEOUS at 08:34

## 2020-01-02 RX ADMIN — ONDANSETRON 4 MG: 4 TABLET, ORALLY DISINTEGRATING ORAL at 15:14

## 2020-01-02 RX ADMIN — OXYCODONE HYDROCHLORIDE 10 MG: 5 TABLET ORAL at 00:59

## 2020-01-02 RX ADMIN — OXYCODONE HYDROCHLORIDE 10 MG: 5 TABLET ORAL at 08:34

## 2020-01-02 RX ADMIN — SENNOSIDES AND DOCUSATE SODIUM 2 TABLET: 8.6; 5 TABLET ORAL at 08:34

## 2020-01-02 ASSESSMENT — ACTIVITIES OF DAILY LIVING (ADL)
ADLS_ACUITY_SCORE: 14

## 2020-01-02 NOTE — PROGRESS NOTES
SPIRITUAL HE[facility] SERVICES Progress Note  [facility]    Initial visit per LOS.  Pt declined offer of a SH visit today and cites no current concerns/needs.  SH remains available per pt request.                                                                                                                                           Aki Hernadez M.Div., [facility]  Staff   Pager [phone]

## 2020-01-02 NOTE — DISCHARGE SUMMARY
[facility]    Discharge Summary  Hospitalist    Date of Admission:  12/28/2019  Date of Discharge:  1/2/2020  Discharging Provider: Ofelia Dewey MD    Discharge Diagnoses   1. Right femoral neck fracture following a mechanical fall; status post right total hip arthroplasty 12/29/19  2. Postop blood loss anemia  3. Postop fever 2/2 atelectasis  4. Postop hypoxia 2/2 atelectasis  5. Constipation  6. Hypokalemia    Chief Complaint: Fall  History of Present Illness   Antoinette Collazo is a 64 year old female who presents by EMS after a fall. The patient was out walking her dog today when she slipped on the ice and fell, landing on her right and causing immediate severe nonradiating right hip pain. She denied hitting her head, losing consciousness or walking after falling. EMS was called to bring the patient to the ED with concern for hip fracture. En route she was given 1.5 mg of Dilaudid and 4 mg of Zofran. The patient reported that at baseline she can ambulate on her own. Of note the patient stated that her good ear is her right ear due to chronic hearing impairment.    She is not on blood thinners.  She denies concern for any injuries besides her right hip.  She worries that she has broken her right hip.       Hospital Course   Antoinette Collazo was admitted on 12/28/2019.  The following problems were addressed during her hospitalization:  Ms. Bradly Collazo is a 64-year-old pleasant female with no significant past medical history, who presented to ED following a mechanical fall, resulting in right femoral neck fracture.       Right femoral neck fracture following a mechanical fall; status post right total hip arthroplasty 12/29/19   postop blood loss anemia  - Hemoglobin 14---11---10.5-> 10.3 today  - evaluated by physical therapy & recommening TCU at discharge but has insurance problems for coverage. SW consulted for disposition planning.  - orthopedics following      Postop  fever   Post op hypoxia  - postop Tmax 102.SOB, hypoxic with elevated dimer; Normal WBC, lactic acid. CT PE was done which showed no PE, did show some bibasilar consolidative pulmonary opacities, likely atelectasis  - clinically better and off oxygen, temp improving  - intermittent fever - 101 this morning-> repeat w/o medication 99.CBC w/diff nl , procal 0.12 . Pt off supplemental O2, no c/o.  - at discharge emphasized  IS  - no need for antibiotics, f/u with PCP      Acute blood loss anemia:  preop hgb 14.2-> 11.4-> 10.5-> 10.3 -> 11.1 today  - consider po ferrous gluconate at discharge or when constipation improves      Constipation:  - pt does not like to take liq med.  - continue senna- docusate   - improved with po dulcolax, prunes, increase po fluids      Incidental lung nodule, 7 mm  -outpatient follow-up with PCP with CT in 6 to 12 months      Urinary retention:   -  Likely due to opiates ; Ochoa placed overnight on 12/29  - will remove ochoa and do voiding trial      Hypokalemia  - K 3.3; will start supple protocol     Ofelia Dewey MD    Significant Results and Procedures   S/p Right total hip arthroplasty by Chace Brito MD on 12/30/2019.     Pending Results   None pending  Unresulted Labs Ordered in the Past 30 Days of this Admission     No orders found from 11/28/2019 to 12/29/2019.          Code Status   Full Code       Primary Care Physician   Zach Mancera    Physical Exam   Temp: 99  F (37.2  C) Temp src: Oral BP: 118/64   Heart Rate: 87 Resp: 16 SpO2: 93 % O2 Device: None (Room air)    Vitals:    01/01/20 0715   Weight: 60.2 kg (132 lb 11.2 oz)     Vital Signs with Ranges  Temp:  [98.3  F (36.8  C)-101  F (38.3  C)] 99  F (37.2  C)  Heart Rate:  [87-89] 87  Resp:  [15-16] 16  BP: (104-136)/(62-75) 118/64  SpO2:  [93 %-95 %] 93 %  I/O last 3 completed shifts:  In: 200 [P.O.:200]  Out: -     Examination:  Well-built well-nourished. In NAD  Heart: Regular rhythm. S1S2, no murmurs,  rubs,gallops  Lungs: Clear to auscultation. And no rhonchi rales or wheezing heard.  Abdomen: Soft and nontender. Bowel sounds present.  Extremities: No swelling.  Neuro:A,A,Ox3, motor sensory grossly intact    Discharge Disposition   Discharged to home with HH, RN,PT.  Condition at discharge: Stable    Consultations This Hospital Stay   ORTHOPEDICS IP CONSULT  SOCIAL WORK IP CONSULT  OCCUPATIONAL THERAPY ADULT IP CONSULT  PHYSICAL THERAPY ADULT IP CONSULT  PHYSICAL THERAPY ADULT IP CONSULT  OCCUPATIONAL THERAPY ADULT IP CONSULT  SOCIAL WORK IP CONSULT  OCCUPATIONAL THERAPY ADULT IP CONSULT    Time Spent on this Encounter   IOfelia MD, MD, personally saw the patient today and spent greater than 30 minutes discharging this patient.    Discharge Orders      General info for SNF    Length of Stay Estimate: Short Term Care: Estimated # of Days <30  Condition at Discharge: Improving  Level of care:skilled   Rehabilitation Potential: Good  Admission H&P remains valid and up-to-date: Yes  Recent Chemotherapy: N/A  Use Nursing Home Standing Orders: Yes     Mantoux instructions    Give two-step Mantoux (PPD) Per Facility Policy Yes     Reason for your hospital stay    Right hip fracture:  Right EITAN (posterior/lateral approach)     Wound care (specify)    Site:   Right hip  Instructions:  Keep dressings intact, change if >60% saturated or peeling off.  Keep incision dry, able to shower over dressings if aquacel or tegaderm dressing.     Follow Up and recommended labs and tests    Follow up with Dr. Brito at [facility] 2 weeks post surgery.   Call [phone] for appointment and questions.     Activity - Up with assistive device    Up with walker     Weight bearing status    WBAT Right LE with walker     Physical Therapy Adult Consult    Evaluate and treat as clinically indicated.    Reason:  Right hip fracture:  Right EITAN (posterior/lateral approach)     Occupational Therapy Adult Consult    Evaluate and treat  as clinically indicated.    Reason:  Right hip fracture:  Right EITAN (posterior/lateral approach)     Fall precautions     Discharge Medications   Current Discharge Medication List      START taking these medications    Details   acetaminophen (TYLENOL) 325 MG tablet Take 2 tablets (650 mg) by mouth every 4 hours as needed for other (For optimal non-opioid multimodal pain management to improve pain control and physical function.)  Qty: 30 tablet, Refills: 0    Associated Diagnoses: Hip fracture, right, closed, initial encounter (H)      ondansetron (ZOFRAN-ODT) 4 MG ODT tab Take 1 tablet (4 mg) by mouth every 6 hours as needed for nausea or vomiting  Qty: 6 tablet, Refills: 0    Associated Diagnoses: Hip fracture, right, closed, initial encounter (H)      order for DME Equipment being ordered: Walker Wheels () and Walker ()  Treatment Diagnosis: impaired gait  Qty: 1 each, Refills: 0    Associated Diagnoses: Hip fracture, right, closed, initial encounter (H)      oxyCODONE (ROXICODONE) 5 MG tablet Take 1-2 tablets (5-10 mg) by mouth every 4 hours as needed  Qty: 25 tablet, Refills: 0    Associated Diagnoses: Hip fracture, right, closed, initial encounter (H)      senna-docusate (SENOKOT-S/PERICOLACE) 8.6-50 MG tablet Take 1 tablet by mouth 2 times daily as needed for constipation  Qty: 20 tablet, Refills: 0    Associated Diagnoses: Hip fracture, right, closed, initial encounter (H)         CONTINUE these medications which have NOT CHANGED    Details   conjugated estrogens (PREMARIN) 0.625 MG/GM vaginal cream Place 0.5 g vaginally once a week      latanoprost (XALATAN) 0.005 % ophthalmic solution Place 1 drop into both eyes daily      sodium chloride (OCEAN) 0.65 % nasal spray Spray 1 spray into both nostrils daily as needed for congestion           Allergies   Allergies   Allergen Reactions     Amoxicillin Swelling and Rash     Ancef [Cefazolin] Swelling and Rash     Ibuprofen Nausea and Vomiting      Toradol [Ketorolac Tromethamine] Nausea and Vomiting     Data   Most Recent 3 CBC's:  Recent Labs   Lab Test 01/02/20  1215 01/02/20  0701 01/01/20  0704 12/31/19  0516   WBC 10.1  --  8.5 9.5   HGB 11.1*  --  10.3* 10.5*   MCV 93  --  93 92    258 192 179      Most Recent 3 BMP's:  Recent Labs   Lab Test 01/02/20  0701 01/01/20  0704 12/31/19  1620 12/31/19  0516  12/28/19  1830   NA  --  137  --  135  --  141   POTASSIUM  --  3.9 3.6 3.3*  --  3.8   CHLORIDE  --  103  --  102  --  107   CO2  --  30  --  27  --  30   BUN  --  8  --  11  --  11   CR 0.67 0.64  --  0.67   < > 0.64   ANIONGAP  --  4  --  6  --  4   ABIEL  --  8.2*  --  7.9*  --  9.0   GLC  --  97  --  108*  --  124*    < > = values in this interval not displayed.     Most Recent 2 LFT's:No lab results found.  Most Recent INR's and Anticoagulation Dosing History:  Anticoagulation Dose History     There is no flowsheet data to display.        Most Recent 3 Troponin's:No lab results found.  Most Recent Cholesterol Panel:No lab results found.  Most Recent 6 Bacteria Isolates From Any Culture (See EPIC Reports for Culture Details):No lab results found.  Most Recent TSH, T4 and A1c Labs:No lab results found.  Results for orders placed or performed during the hospital encounter of 12/28/19   XR Pelvis and Hip Right 1 View    Narrative    Examination:  XR PELVIS AND HIP RIGHT 1 VIEW    Date:  12/28/2019 6:08 PM     Clinical Information: pain after blunt trauma today     Additional Information: none    Comparison: none      Impression    Impression:  1.  Acute, simple, transversely oriented fracture of the right femoral  neck, mildly displaced/angulated.  2.  Normal right hip joint alignment maintained. Maintained joint  space.  3.  The remainder of the pelvis and left hip are normal.    ELVIRA PAUL MD   XR Chest 1 View    Narrative    Examination:  XR CHEST 1 VW    Date:  12/28/2019 6:07 PM     Clinical Information: Acute right hip injury.  Presurgical  planning/screening exam.    Additional Information: none    Comparison: none    Findings:     Clear lungs without focal infiltrate, pulmonary edema, or pleural  fluid. Normal heart and pulmonary vasculature. No acute osseous  abnormality in the chest.      Impression    Impression:    1. Normal chest. Clear lungs.    ELVIRA PAUL MD   XR Pelvis w Hip Port Right 1 View    Narrative    XR PELVIS AD HIP PORTABLE RIGHT 1 VIEW 12/29/2019 3:05 PM     HISTORY: s/p EITAN      Impression    IMPRESSION: Right total hip arthroplasty without apparent  complication. Postoperative soft tissue gas is noted.    SABA ELDER MD   CT Chest Pulmonary Embolism w Contrast    Narrative    CT CHEST PULMONARY EMBOLISM WITH CONTRAST  12/31/2019 7:37 AM    HISTORY:  PE suspected, high pretest probability. Recent hip  arthroplasty. D-dimer 1.2, hypoxia, tachycardia, new fever.    TECHNIQUE: Scans obtained from the apices through the diaphragm with  IV contrast. 56 mL Isovue-370 IV injected. Radiation dose for this  scan was reduced using automated exposure control, adjustment of the  mA and/or kV according to patient size, or iterative reconstruction  technique.    COMPARISON:  Chest x-ray on 12/28/2019.    FINDINGS:  Chest/mediastinum: No evidence of pulmonary embolism. No cardiomegaly  or pericardial effusion. No significant mediastinal, hilar or axillary  lymphadenopathy.    Lungs and pleura: No pleural effusion or pneumothorax. Bibasilar  pulmonary opacities, likely atelectasis versus less likely infection.  Few scattered pulmonary nodules; for example there is a 7 mm right  upper lobe pulmonary nodule (series 8 image 111). Limited evaluation  of the upper abdomen due to lack of coverage and timing of contrast.    Upper abdomen: Limited evaluation of the upper abdomen due to lack of  coverage and timing of contrast.    Bones and soft tissue: No suspicious osseous lesion.      Impression    IMPRESSION:   1. No evidence  of pulmonary embolism.  2. Bibasilar consolidative pulmonary opacities, likely atelectasis  versus less likely infection.  3. 7 mm right upper lobe pulmonary nodule; as per Fleischner society  criteria, recommend follow-up exam with chest CT in 6-12 months to  ensure stability.    BATSHEVA GALLAGHER MD

## 2020-01-02 NOTE — PROGRESS NOTES
Care Coordination:    Initial assessment done by SW, anticipating TCU.  However, now Home Care is recommended.    Met with patient in room, introduced self and role in discharge planning.  Pt/family was given the Medicare Compare list for Home Care, with associated star ratings to assist with choice for referrals/discharge planning Yes. Education was given to pt/family that star ratings are updated/maintained by Medicare and can be reviewed by visiting www.medicare.gov Yes.    Pt's main question is whether insurance will cover Home Care services.  This question was relayed to [facility] with pt's consent, answer pending.  Pt will review the entire Home Care list and RN case manager to check back later with ultimate decision on which agency.     Pt likely to need Home Care PT, OT, HHA to help with bathing, possibly RN for incision check.      Joie Gross RN, BSN, PHN  [facility] Care Coordination  [facility]   Mobile: [phone]

## 2020-01-02 NOTE — PROGRESS NOTES
[facility]  Met with patient to discuss plans for HC.  Pt to be discharged home today and has agreed to have [facility] follow with services of SN, PT and OT. Patient care support center processing referral.  Patient verbalized understanding that initial visit is scheduled for 1/3/20 or 1/4/20.  Pt has 24 hour phone number for [facility] for any questions or concerns.

## 2020-01-02 NOTE — PROGRESS NOTES
01/02/20 1440   Quick Adds   Type of Visit Initial Occupational Therapy Evaluation   Living Environment   Lives With spouse   Living Arrangements house   Home Accessibility stairs to enter home;stairs within home   Number of Stairs, Main Entrance 2   Number of Stairs, Within Home, Primary other (see comments)  (13)   Transportation Anticipated family or friend will provide   Living Environment Comment Pt has only a tub upstairs, has a walk-in shower but states the hallways and bathroom are too narrow for her to access safely w/ a walker, pt's  states they can use hotel rooms when pt needs to shower   Functional Level   Ambulation 0-->independent   Transferring 0-->independent   Toileting 0-->independent   Bathing 0-->independent   Dressing 0-->independent   Eating 0-->independent   Communication 0-->understands/communicates without difficulty   Swallowing 0-->swallows foods/liquids without difficulty   Cognition 0 - no cognition issues reported   Fall history within last six months yes   Number of times patient has fallen within last six months 2   Prior Functional Level Comment PTA, pt ind w/ all ADL/IADL (but doesn't drive)   General Information   Onset of Illness/Injury or Date of Surgery - Date 12/28/19   Referring Physician Tiffanie Wolfe PA-C   Patient/Family Goals Statement return home   Additional Occupational Profile Info/Pertinent History of Current Problem Right femoral neck fracture following a mechanical fall; status post right total hip arthroplasty 12/29/19   Precautions/Limitations right hip precautions;fall precautions   Weight-Bearing Status - RLE weight-bearing as tolerated   Mobility   Bed Mobility Comments SBA   Transfer Skill: Sit to Stand   Level of [place]: Sit/Stand stand-by assist   Bathing   Level of [place] moderate assist (50% patients effort)   Upper Body Dressing   Level of [place]: Dress Upper Body independent   Lower Body Dressing   Level of  "[place]: Dress Lower Body minimum assist (75% patients effort)   Toileting   Level of [place]: Toilet stand-by assist   Grooming   Level of [place]: Grooming stand-by assist   Eating/Self Feeding   Level of [place]: Eating independent   Activities of Daily Living Analysis   Impairments Contributing to Impaired Activities of Daily Living post surgical precautions   General Therapy Interventions   Planned Therapy Interventions IADL retraining;ADL retraining   Clinical Impression   Criteria for Skilled Therapeutic Interventions Met yes, treatment indicated   OT Diagnosis Decreased ind/safety w/ ADL's and IADL's   Influenced by the following impairments New hip precautions, decreased functional act tolerance and dynamic balance   Assessment of Occupational Performance 1-3 Performance Deficits   Identified Performance Deficits Decreased ind/safety w/ dressing, bathing, toileting, g/h, functional mobility, IADL's   Clinical Decision Making (Complexity) Low complexity   Therapy Frequency   (eval and 1 treatment)   Anticipated Equipment Needs at Discharge bath sponge;reacher;sock aide;long shoe horn   Anticipated Discharge Disposition Home with Assist   Risks and Benefits of Treatment have been explained. Yes   Patient, Family & other staff in agreement with plan of care Yes   [facility] TM \"6 Clicks\"   2016, Trustees of [facility], under license to [employer].  All rights reserved.   6 Clicks Short Forms Daily Activity Inpatient Short Form   [facility]  \"6 Clicks\" Daily Activity Inpatient Short Form   1. Putting on and taking off regular lower body clothing? 3 - A Little   2. Bathing (including washing, rinsing, drying)? 3 - A Little   3. Toileting, which includes using toilet, bedpan or urinal? 3 - A Little   4. Putting on and taking off regular upper body clothing? 4 - None   5. Taking care of personal grooming such as brushing teeth? 3 - A Little   6. Eating " meals? 4 - None   Daily Activity Raw Score (Score out of 24.Lower scores equate to lower levels of function) 20   Total Evaluation Time   Total Evaluation Time (Minutes) 8

## 2020-01-02 NOTE — PROGRESS NOTES
Orthopedic Surgery  Antoinette Collazo  2020  Admit Date:  2019  POD 4 Days Post-Op  S/P Procedure(s):  ARTHROPLASTY, RIGHT HIP, TOTAL. [employer].    Patient resting comfortably in bed  Pain controlled.  Tolerating oral intake.    Denies nausea or vomiting  Denies chest pain or shortness of breath  No events overnight.     Alert and orient to person, place, and time.  Vital Sign Ranges  Temperature Temp  Av.2  F (37.3  C)  Min: 98.3  F (36.8  C)  Max: 101  F (38.3  C)   Blood pressure Systolic (24hrs), Av , Min:104 , Max:136        Diastolic (24hrs), Av, Min:62, Max:75      Pulse No data recorded   Respirations Resp  Avg: 15.8  Min: 15  Max: 16   Pulse oximetry SpO2  Av.7 %  Min: 93 %  Max: 95 %       Dressing is clean, dry, and intact.   Minimal erythema of the surrounding skin.   Bilateral calves are soft, non-tender.  Bilateral lower extremity is NVI.  Sensation intact bilateral lower extremities  5/5 motor with resisted dorsi and plantar flexion bilaterally  +Dp pulse      Labs:  Recent Labs   Lab Test 20  0704 19  1620 19  0516   POTASSIUM 3.9 3.6 3.3*     Recent Labs   Lab Test 20  1215 20  0704 19  0516   HGB 11.1* 10.3* 10.5*     No results for input(s): INR in the last 89426 hours.  Recent Labs   Lab Test 20  1215 20  0701 20  0704    258 192       A/P  1. S/p right total hip arthroplasty   Continue Lovenox for DVT prophylaxis.     Mobilize with PT/OT    WBAT with walker.   Hip precautions     Continue current pain regiment.   Leave dressing intact    2. Disposition   Anticipate d/c to home with home care today    Grecia Narvaez PA-C

## 2020-01-02 NOTE — DISCHARGE INSTRUCTIONS
Your doctor has ordered home care to help you after your hospital stay.  They will contact you regarding your first visit.  This service will be provided by [facility].  If you have any question, or have not received a call within 48 hours of discharge, please call them at [phone].  *please see homecare quality ratings for all homecares in your area at www.medicare.gov

## 2020-01-03 NOTE — PLAN OF CARE
Neuro: Alert and oriented x 4  Recent vital signs:t-max 99.2, tylenol given   Respiratory: high 80s on room air, placed on 2L nasal cannula,   Pain:right hip pain, oxycodone and tylenol given  Tele:none  Activity:assist of one with belt, has not been out of bed this shift  Drips/Drains/IVF:none  Skin:Redness around the dressing on the right hip, cool to touch  GI/:on regular diet  Aggression color:green  Plan:Test/Consult:discharge home today?  Labs:  Misc: No IV access, pt refused,      Events this night    Slept intermittent,complain of nausea, declined medication.  Refused  PCDs, dressing intact on the right hip.  
"Discharge Planner PT   Patient plan for discharge: Pt is unsure.   Current status: Pt supine in bed upon arrival; initially declining therapy stating, \"it's just been a bad day.\" Eventually agreeable to supine strengthening exercises only with encouragement. Declined OOB mobility despite encouragement. Unable to recall post op precautions. Reviewed and cues given to maintain with positioning in bed.   Barriers to return to prior living situation: Level of assist, Pain, Hip Precautions, Stairs-not yet assessed  Recommendations for discharge: TCU per plan established by the PT.  Rationale for recommendations: Pt is below baseline in regards to functional mobility and will benefit from continued skilled PT intervention prior to returning home with spouse.        Entered by: Ana Ross 12/31/2019 9:07 AM     "
"Discharge Planner PT   Patient plan for discharge: \"I don't know\"   Current status: Pt performed STS transfer to FWW with SBA. Ambulated 10 ft x 1 and 160 ft x 1 with FWW and SBA; step through reciprocal pattern with slow pace. Went up/down 3 stairs x 2 using single rail and HHA; tolerated well. Supine <> sit completed with Min A and cues for EITAN precautions.   Barriers to return to prior living situation: stairs to enter without railings, alone during day while spouse works  Recommendations for discharge: Changed to home with Min A for stair navigation and home PT after collaboration with the PT  Rationale for recommendations: Pt is progressing well with functional mobility. Pt is no longer requiring supplemental O2. Would benefit from home PT to address strength and endurance in order to improve independence with mobility. Would be appropriate for Home PT as she would require use of assistive device and another person in order to leave the home.        Entered by: Ana Ross 01/02/2020 9:26 AM     "
Discharge Planner OT   Patient plan for discharge: TCU  Current status: OT orders rec'd, chart reviewed. Per PT, pt is pending discharge to TCU. Will defer OT eval to next level care  Barriers to return to prior living situation: Defer to PT  Recommendations for discharge: Defer to PT  Rationale for recommendations: Pt has no IP OT needs at this time. Will complete orders        Entered by: Ciara Bautista 12/31/2019 9:25 AM         
Discharge Planner OT   Patient plan for discharge: home  Current status: OT eval/tx initiated. After training, pt able to complete LB dressing w/ spv using sockaide and reacher. SBA for bed mob, transfers, and mobility using 2WW. Safety training completed w/ walker for toileting, g/h at sink, safe item retrieval/transport. Pt completed tub transfer w/ SBA after training.     Pt and  have no further self care concerns. Will complete OT orders.  Barriers to return to prior living situation: none anticipated  Recommendations for discharge: home w/ A for bathing and IADL's such as pet care, cleaning, transportation, shopping  Rationale for recommendations: Pt progressing well and learned A.E. quickly. Anticipate pt will safely return home w/ above A.        Entered by: Jamilah Weller 01/02/2020 3:33 PM     Occupational Therapy Discharge Summary    Reason for therapy discharge:    Discharged to home.    Progress towards therapy goal(s). See goals on Care Plan in [facility] electronic health record for goal details.  Goals met    Therapy recommendation(s):    No further therapy is recommended.      
Discharge Planner PT   Patient plan for discharge: Pt is unsure.   Current status: Orders received, evaluation completed, and treatment initiated. Patient is a 63 y/o female POD # 1 R EITAN after sustaining displaced R femoral neck fracture. Pt lives with spouse, 2 stairs to enter/exit and a flight of stairs to access basement. Pt is independent at baseline without use of an AD. Pt performed supine-sit with Haroldo. Sit <> stand with FWW and Haroldo. Pt tolerated ambulation of 100 feet with FWW and CGA, wheelchair follow for safety. Pt agreeable to sit up in chair at end of session. Pt on 2 L/min throughout session.   Barriers to return to prior living situation: Level of assist, Pain, Hip Precautions, Stairs-not yet assessed  Recommendations for discharge: TCU  Rationale for recommendations: Pt is below baseline in regards to functional mobility and will benefit from continued skilled PT intervention prior to returning home with spouse. Pending progress made during hospitalization and pt's spouse ability to assist at time of discharge pt may be able to safely discharge home.       Entered by: Idalia Hayes 12/30/2019 10:22 AM       
Discharge Planner PT   Patient plan for discharge: pt hopes for TCU, worried about insurance coverage  Current status: PT - pt transfers with min assist, ambulated 150' with wh walker and CGA, stairs with min assist. Needs cues for EITAN precautions, did not remember them. Reports spouse unable to assist at home.  Barriers to return to prior living situation: requires assist 1 for all mobility, stairs to enter without railings  Recommendations for discharge: TCU  Rationale for recommendations: Pt would benefit from cont skilled PT at TCU to address new deficits in strength and mobility.       Entered by: Tamika Hooks 01/01/2020 8:55 AM       
OT- IP OT orders received. Per chart, TCU vs home likely in 2 days. Discussed with IP PT. Will hold OT evaluation at this time to allow pt to progress in functional mobility. Will continue to assess if IP OT warranted.    
Physical Therapy Discharge Summary    Reason for therapy discharge:    Discharged to home with home therapy.    Progress towards therapy goal(s). See goals on Care Plan in [facility] electronic health record for goal details.  Goals met    Therapy recommendation(s):    Continued therapy is recommended.  Rationale/Recommendations:  Home PT to progress functional strength, ROM, safety and IND with functional mobility.      
Pt A&O, vss, cms intact. Slight temp. 99.1- given tylenol, asymptomatic. Rash on back, no complaint of itchiness. Adequate output. RA. Oxy for pain only once this shift. Plan to discharge home this evening.   
Pt A&Ox4. CMS intact. VSS. Up w/ A2 walkig in room. Pt turned and repo'd q2hrs. Taking oxy, tylenol and atarax for pain. C/o itching x1 overnight. Voiding per ochoa, dtv this am. Continue to monitor.     
Pt A/O. Deaf in L ear. VSS on 1L O2 overnight, except tachy. C/o R hip/leg pain, PRN IV Dilaudid given w/ slight improvement. Tylenol given x1 for headache. C/o nausea, Zofran given x2 w/ improvement. PIV w/ NS at 100 ml/hr. Pt unable to void, bladder scanned for 471 ml. Longoria placed w/ adequate output. NPO since midnight. Bedrest. Plan for surgery at 1140.   
Pt A/Ox4, VSS on RA, oxycodone given for pain, CMS intact, assist of 1 to the bathroom, no IV access, will continue to monitor  
Pt is AOx4, CMS intact, pain is moderate to severe with movement, managed with oxy, assist 1, ambulated to BR, voiding.   @0430 pt noted to have fever, tachycardia, tachypnea after returning from BR, O2 85% on RA, improved to 92% on 3L, MARADIAGA, mild elevated BP. MD notified.   Pt is pending CT chest, has been NPO since 0530.   
Pt. A&o, vss, up with one assist and walker, voiding adequate amount in b.r, taking oxycodone for pain, dressing CDI, pt. Took shower before discharge, discharge instruction reviewed with pt. discharge medication given to the pt. And pt. discharge to home with family at 1842.  
Sleepy but wakes up to voice. Alert and oriented x 4. Dressing cdi, cms intact. VSS, on 2 L oxygen. Longoria. Abductor pillow in place. Ambulated in room with walker and assist of 2. Atarax for itching  
Up with one assist, walker, gait belt. Taking oxycodone for pain with adequate results. Right hip dressing clean and dry. CMS intact. No nausea. Progressing per pathway.  
VSS on O2-2L. CMS intact and Dressing CDI. Incouraged IS and pt stated she's feeling better from this morning. Up with assist of 1 to the bathroom. Pt insisted on removing IV d/t pain and is wanting to wait for new IV access till needed- MD notified. Pain controlled with oxycodone and tylenol. Will continue to monitor.  
VSS on RA, CMS intact. Dressing CDI. Tolerating oral intake and voiding adequately. Up with assist of 1 with walker and GB. Will cont to monitor.  
VSS on RA, CMS intact. Dressing CDI. Up with assist of 1 with GB and walker. Pain managed with oxycodone and tylenol. Voiding adequately and adequate oral intake. Continue to monitor.  
VSS on RA.voiding in BR, had BM today, up with SBA/walker/GB, nausea resolved with Zofran this am, appetite improving, aquacel in place, was compliant with PCD's this shift, plans on discharging home with .  
[phone]

## 2020-01-04 ENCOUNTER — DOCUMENTATION ONLY (OUTPATIENT)
Dept: CARE COORDINATION | Facility: CLINIC | Age: 65
End: 2020-01-04

## 2020-01-04 NOTE — PROGRESS NOTES
.[facility] now requests orders and shares plan of care/discharge summaries for some patients through [employer].  Please REPLY TO THIS MESSAGE OR ROUTE BACK TO THE AUTHOR in order to give authorization for orders when needed.  This is considered a verbal order, you will still receive a faxed copy of orders for signature.  Thank you for your assistance in improving collaboration for our patients.    ORDER     Skilled nurse 2week2, 1week2 plus 3 prn visits  PT eval and treat  OT eval and treat

## 2020-03-22 ENCOUNTER — HEALTH MAINTENANCE LETTER (OUTPATIENT)
Age: 65
End: 2020-03-22

## 2021-01-01 ENCOUNTER — OFFICE VISIT (OUTPATIENT)
Dept: URGENT CARE | Facility: URGENT CARE | Age: 66
End: 2021-01-01
Payer: COMMERCIAL

## 2021-01-01 VITALS
BODY MASS INDEX: 22.86 KG/M2 | WEIGHT: 129 LBS | DIASTOLIC BLOOD PRESSURE: 77 MMHG | SYSTOLIC BLOOD PRESSURE: 158 MMHG | HEIGHT: 63 IN | OXYGEN SATURATION: 97 % | HEART RATE: 87 BPM | TEMPERATURE: 98.8 F

## 2021-01-01 DIAGNOSIS — H61.21 IMPACTED CERUMEN OF RIGHT EAR: ICD-10-CM

## 2021-01-01 DIAGNOSIS — J01.90 ACUTE NON-RECURRENT SINUSITIS, UNSPECIFIED LOCATION: Primary | ICD-10-CM

## 2021-01-01 PROCEDURE — U0005 INFEC AGEN DETEC AMPLI PROBE: HCPCS | Performed by: PHYSICIAN ASSISTANT

## 2021-01-01 PROCEDURE — 99203 OFFICE O/P NEW LOW 30 MIN: CPT | Performed by: PHYSICIAN ASSISTANT

## 2021-01-01 PROCEDURE — U0003 INFECTIOUS AGENT DETECTION BY NUCLEIC ACID (DNA OR RNA); SEVERE ACUTE RESPIRATORY SYNDROME CORONAVIRUS 2 (SARS-COV-2) (CORONAVIRUS DISEASE [COVID-19]), AMPLIFIED PROBE TECHNIQUE, MAKING USE OF HIGH THROUGHPUT TECHNOLOGIES AS DESCRIBED BY CMS-2020-01-R: HCPCS | Performed by: PHYSICIAN ASSISTANT

## 2021-01-01 RX ORDER — DOXYCYCLINE HYCLATE 100 MG
100 TABLET ORAL 2 TIMES DAILY
Qty: 20 TABLET | Refills: 0 | Status: SHIPPED | OUTPATIENT
Start: 2021-01-01 | End: 2021-01-11

## 2021-01-01 RX ORDER — FLUTICASONE PROPIONATE 50 MCG
1 SPRAY, SUSPENSION (ML) NASAL DAILY
Qty: 1 G | Refills: 0 | Status: SHIPPED | OUTPATIENT
Start: 2021-01-01

## 2021-01-01 ASSESSMENT — ENCOUNTER SYMPTOMS
SHORTNESS OF BREATH: 0
COUGH: 1
RHINORRHEA: 1
CHILLS: 0
DIAPHORESIS: 0
FEVER: 0
APPETITE CHANGE: 0
CARDIOVASCULAR NEGATIVE: 1
FATIGUE: 1

## 2021-01-01 ASSESSMENT — MIFFLIN-ST. JEOR: SCORE: 1104.27

## 2021-01-01 NOTE — PROGRESS NOTES
SUBJECTIVE:   Antoinette Collazo is a 65 year old female presenting with a chief complaint of   Chief Complaint   Patient presents with     Sinus Problem     sinus pressure, throat feels irritated, headaches, congestion for a few days, lost smell/taste for 4-5 days        She is an established patient of [facility].  Patient presents with HA, green nasal discharge, PND, globus.  COVID tested in September last.      Treatment:  Tylenol prn.      No surgeries to head neck/chest  Shx:  none    Review of Systems   Constitutional: Positive for fatigue. Negative for appetite change, chills, diaphoresis and fever.   HENT: Positive for congestion and rhinorrhea. Negative for ear pain.    Respiratory: Positive for cough. Negative for shortness of breath.    Cardiovascular: Negative.    All other systems reviewed and are negative.      History reviewed. No pertinent past medical history.  History reviewed. No pertinent family history.  Current Outpatient Medications   Medication Sig Dispense Refill     acetaminophen (TYLENOL) 325 MG tablet Take 2 tablets (650 mg) by mouth every 4 hours as needed for other (For optimal non-opioid multimodal pain management to improve pain control and physical function.) 30 tablet 0     bisacodyl (DULCOLAX) 5 MG EC tablet Take 1 tablet (5 mg) by mouth daily as needed for constipation 20 tablet 0     doxycycline hyclate (VIBRA-TABS) 100 MG tablet Take 1 tablet (100 mg) by mouth 2 times daily for 10 days 20 tablet 0     fluticasone (FLONASE) 50 MCG/ACT nasal spray Spray 1 spray into both nostrils daily 1 g 0     latanoprost (XALATAN) 0.005 % ophthalmic solution Place 1 drop into both eyes daily       ondansetron (ZOFRAN-ODT) 4 MG ODT tab Take 1 tablet (4 mg) by mouth every 6 hours as needed for nausea or vomiting 6 tablet 0     order for DME Equipment being ordered: Walker Wheels () and Walker ()  Treatment Diagnosis: impaired gait 1 each 0     oxyCODONE (ROXICODONE) 5 MG tablet Take  "1-2 tablets (5-10 mg) by mouth every 4 hours as needed 25 tablet 0     senna-docusate (SENOKOT-S/PERICOLACE) 8.6-50 MG tablet Take 1 tablet by mouth 2 times daily as needed for constipation 20 tablet 0     sodium chloride (OCEAN) 0.65 % nasal spray Spray 1 spray into both nostrils daily as needed for congestion       Social History     Tobacco Use     Smoking status: Not on file   Substance Use Topics     Alcohol use: Not on file       OBJECTIVE  BP (!) 158/77   Pulse 87   Temp 98.8  F (37.1  C)   Ht 1.6 m (5' 3\")   Wt 58.5 kg (129 lb)   SpO2 97%   BMI 22.85 kg/m      Physical Exam    Labs:  No results found for this or any previous visit (from the past 24 hour(s)).    X-Ray was not done.    ASSESSMENT:      ICD-10-CM    1. Acute non-recurrent sinusitis, unspecified location  J01.90 Symptomatic COVID-19 Virus (Coronavirus) by PCR     doxycycline hyclate (VIBRA-TABS) 100 MG tablet     fluticasone (FLONASE) 50 MCG/ACT nasal spray   2. Impacted cerumen of right ear  H61.21         Medical Decision Making:    Differential Diagnosis:  URI Adult/Peds:  Acute right otitis media, Sinusitis and covid    Serious Comorbid Conditions:  Adult:  as above    PLAN:    URI Adult: Patient's ears irrigated.  TM normal after irrigation.  Rx for doxycycline.  Discussed and recommended CDC guidelines for self isolation.  COVID test pending.          Followup:    If not improving or if condition worsens, follow up with your Primary Care Provider, If not improving or if conditions worsens over the next 12-24 hours, go to the Emergency Department    Patient Instructions     Patient Education       Earwax, Home Treatment    Everyone produces earwax from the lining of the ear canal. It serves to lubricate and protect the ear. The wax that forms in the canal naturally moves toward the outside of the ear and falls out. Sometimes the ear canal may contain too much wax. This can cause a blockage and loss of hearing. Directions are given " below for home treatment.  Home care  If your doctor has advised you to remove a wax blockage yourself, follow these directions:    Unless a medicine was prescribed, you may use an over-the-counter product made for clearing earwax. These contain carbamide peroxide. Lie down with the blocked ear facing upward. Apply one dropper full of medicine and wait a few minutes. Grasp the outer ear and wiggle it to help the solution enter the canal.    Lean over a sink or basin with the blocked ear facing downward. Use a bulb syringe filled with warm (not hot or cold) water to rinse the ear several times. Use gentle pressure only.    If you are having trouble draining the water out of your ear canal, put a few drops of rubbing alcohol (isopropyl alcohol) into the ear canal. This will help remove the remaining water.    Repeat this procedure once a day for up to three days, or until your hearing is back to normal. Do not use this treatment for more than three days in a row.  Don ts    Don t use cold water to rinse the ear. This will make you dizzy.    Don t perform this procedure if you have an ear infection.    Don t perform this procedure if you have a ruptured eardrum.    Don t use cotton swabs, matches, hairpins, keys, or other objects to  clean  the ear canal. This can cause infection of the ear canal or rupture the eardrum. Because of their size and shape, cotton swabs can push earwax deeper into the ear canal instead of removing it.  Follow-up care  Follow up with your health care provider if you are not improving after three cleaning attempts, or as advised.  When to seek medical advice  Call your health care provider right away if any of these occur:    Worsening ear pain    Fever of 101 F (38.3 C) or higher, or as directed by your health care provider    Hearing does not return to normal after three days of treatment    Fluid drainage or bleeding from the ear canal    Swelling, redness, or tenderness of the outer  ear    Headache, neck pain, or stiff neck    1505-3338 [employer]. [address] [place] [address], [place] [address]. All rights reserved. This information is not intended as a substitute for professional medical care. Always follow your healthcare professional's instructions.           Patient Education     Sinusitis (Antibiotic Treatment)    The sinuses are air-filled spaces within the bones of the face. They connect to the inside of the nose. Sinusitis is an inflammation of the tissue that lines the sinuses. Sinusitis can occur during a cold. It can also happen due to allergies to pollens and other particles in the air. Sinusitis can cause symptoms of sinus congestion and a feeling of fullness. A sinus infection causes fever, headache, and facial pain. There is often green or yellow fluid draining from the nose or into the back of the throat (post-nasal drip). You have been given antibiotics to treat this condition.   Home care    Take the full course of antibiotics as instructed. Don't stop taking them, even when you feel better.    Drink plenty of water, hot tea, and other liquids as directed by the healthcare provider. This may help thin nasal mucus. It also may help your sinuses drain fluids.    Heat may help soothe painful areas of your face. Use a towel soaked in hot water. Or,  the shower and direct the warm spray onto your face. Using a vaporizer along with a menthol rub at night may also help soothe symptoms.     An expectorant with guaifenesin may help thin nasal mucus and help your sinuses drain fluids. Talk with your provider or pharmacists before taking an over-the-counter (OTC) medicine if you have any questions about it or its side effects..    You can use an OTC decongestant, unless a similar medicine was prescribed to you. Nasal sprays work the fastest. Use one that contains phenylephrine or oxymetazoline. First blow your nose gently. Then use the spray. Don't use these medicines  more often than directed on the label. If you do, your symptoms may get worse. You may also take pills that contain pseudoephedrine. Don t use products that combine multiple medicines. This is because side effects may be increased. Read labels. You can also ask the pharmacist for help. (People with high blood pressure should not use decongestants. They can raise blood pressure.) Talk with your provider or pharmacist if you have any questions about the medicine..    OTC antihistamines may help if allergies contributed to your sinusitis. Talk with your provider or pharmacist if you have any questions about the medicine..    Don't use nasal rinses or irrigation during an acute sinus infection, unless your healthcare provider tells you to. Rinsing may spread the infection to other areas in your sinuses.    Use acetaminophen or ibuprofen to control pain, unless another pain medicine was prescribed to you. If you have chronic liver or kidney disease or ever had a stomach ulcer, talk with your healthcare provider before using these medicines. Never give aspirin to anyone under age 18 who is ill with a fever. It may cause severe liver damage.    Don't smoke. This can make symptoms worse.    Follow-up care  Follow up with your healthcare provider, or as advised.   When to seek medical advice  Call your healthcare provider if any of these occur:     Facial pain or headache that gets worse    Stiff neck    Unusual drowsiness or confusion    Swelling of your forehead or eyelids    Symptoms don't go away in 10 days    Vision problems, such as blurred or double vision    Fever of 100.4 F (38 C) or higher, or as directed by your healthcare provider  Call 911  Call 911 if any of these occur:     Seizure    Trouble breathing    Feeling dizzy or faint    Fingernails, skin or lips look blue, purple , or gray  Prevention  Here are steps you can take to help prevent an infection:     Keep good hand washing habits.    Don t have close  contact with people who have sore throats, colds, or other upper respiratory infections.    Don t smoke, and stay away from secondhand smoke.    Stay up to date with of your vaccines.  Dedra last reviewed this educational content on 12/1/2019 2000-2020 [employer]. [address] [place] [address]. All rights reserved. This information is not intended as a substitute for professional medical care. Always follow your healthcare professional's instructions.

## 2021-01-01 NOTE — PATIENT INSTRUCTIONS
Patient Education       Earwax, Home Treatment    Everyone produces earwax from the lining of the ear canal. It serves to lubricate and protect the ear. The wax that forms in the canal naturally moves toward the outside of the ear and falls out. Sometimes the ear canal may contain too much wax. This can cause a blockage and loss of hearing. Directions are given below for home treatment.  Home care  If your doctor has advised you to remove a wax blockage yourself, follow these directions:    Unless a medicine was prescribed, you may use an over-the-counter product made for clearing earwax. These contain carbamide peroxide. Lie down with the blocked ear facing upward. Apply one dropper full of medicine and wait a few minutes. Grasp the outer ear and wiggle it to help the solution enter the canal.    Lean over a sink or basin with the blocked ear facing downward. Use a bulb syringe filled with warm (not hot or cold) water to rinse the ear several times. Use gentle pressure only.    If you are having trouble draining the water out of your ear canal, put a few drops of rubbing alcohol (isopropyl alcohol) into the ear canal. This will help remove the remaining water.    Repeat this procedure once a day for up to three days, or until your hearing is back to normal. Do not use this treatment for more than three days in a row.  Don ts    Don t use cold water to rinse the ear. This will make you dizzy.    Don t perform this procedure if you have an ear infection.    Don t perform this procedure if you have a ruptured eardrum.    Don t use cotton swabs, matches, hairpins, keys, or other objects to  clean  the ear canal. This can cause infection of the ear canal or rupture the eardrum. Because of their size and shape, cotton swabs can push earwax deeper into the ear canal instead of removing it.  Follow-up care  Follow up with your health care provider if you are not improving after three cleaning attempts, or as  advised.  When to seek medical advice  Call your health care provider right away if any of these occur:    Worsening ear pain    Fever of 101 F (38.3 C) or higher, or as directed by your health care provider    Hearing does not return to normal after three days of treatment    Fluid drainage or bleeding from the ear canal    Swelling, redness, or tenderness of the outer ear    Headache, neck pain, or stiff neck    9805-5268 [employer]. [address] [place] [address], [place] [address]. All rights reserved. This information is not intended as a substitute for professional medical care. Always follow your healthcare professional's instructions.           Patient Education     Sinusitis (Antibiotic Treatment)    The sinuses are air-filled spaces within the bones of the face. They connect to the inside of the nose. Sinusitis is an inflammation of the tissue that lines the sinuses. Sinusitis can occur during a cold. It can also happen due to allergies to pollens and other particles in the air. Sinusitis can cause symptoms of sinus congestion and a feeling of fullness. A sinus infection causes fever, headache, and facial pain. There is often green or yellow fluid draining from the nose or into the back of the throat (post-nasal drip). You have been given antibiotics to treat this condition.   Home care    Take the full course of antibiotics as instructed. Don't stop taking them, even when you feel better.    Drink plenty of water, hot tea, and other liquids as directed by the healthcare provider. This may help thin nasal mucus. It also may help your sinuses drain fluids.    Heat may help soothe painful areas of your face. Use a towel soaked in hot water. Or,  the shower and direct the warm spray onto your face. Using a vaporizer along with a menthol rub at night may also help soothe symptoms.     An expectorant with guaifenesin may help thin nasal mucus and help your sinuses drain fluids. Talk with your  provider or pharmacists before taking an over-the-counter (OTC) medicine if you have any questions about it or its side effects..    You can use an OTC decongestant, unless a similar medicine was prescribed to you. Nasal sprays work the fastest. Use one that contains phenylephrine or oxymetazoline. First blow your nose gently. Then use the spray. Don't use these medicines more often than directed on the label. If you do, your symptoms may get worse. You may also take pills that contain pseudoephedrine. Don t use products that combine multiple medicines. This is because side effects may be increased. Read labels. You can also ask the pharmacist for help. (People with high blood pressure should not use decongestants. They can raise blood pressure.) Talk with your provider or pharmacist if you have any questions about the medicine..    OTC antihistamines may help if allergies contributed to your sinusitis. Talk with your provider or pharmacist if you have any questions about the medicine..    Don't use nasal rinses or irrigation during an acute sinus infection, unless your healthcare provider tells you to. Rinsing may spread the infection to other areas in your sinuses.    Use acetaminophen or ibuprofen to control pain, unless another pain medicine was prescribed to you. If you have chronic liver or kidney disease or ever had a stomach ulcer, talk with your healthcare provider before using these medicines. Never give aspirin to anyone under age 18 who is ill with a fever. It may cause severe liver damage.    Don't smoke. This can make symptoms worse.    Follow-up care  Follow up with your healthcare provider, or as advised.   When to seek medical advice  Call your healthcare provider if any of these occur:     Facial pain or headache that gets worse    Stiff neck    Unusual drowsiness or confusion    Swelling of your forehead or eyelids    Symptoms don't go away in 10 days    Vision problems, such as blurred or double  vision    Fever of 100.4 F (38 C) or higher, or as directed by your healthcare provider  Call 911  Call 911 if any of these occur:     Seizure    Trouble breathing    Feeling dizzy or faint    Fingernails, skin or lips look blue, purple , or gray  Prevention  Here are steps you can take to help prevent an infection:     Keep good hand washing habits.    Don t have close contact with people who have sore throats, colds, or other upper respiratory infections.    Don t smoke, and stay away from secondhand smoke.    Stay up to date with of your vaccines.  [employer] last reviewed this educational content on 12/1/2019 2000-2020 [employer]. [address] [place] [address], [place] [address]. All rights reserved. This information is not intended as a substitute for professional medical care. Always follow your healthcare professional's instructions.

## 2021-01-02 ENCOUNTER — TELEPHONE (OUTPATIENT)
Dept: URGENT CARE | Facility: URGENT CARE | Age: 66
End: 2021-01-02

## 2021-01-02 LAB
SARS-COV-2 RNA SPEC QL NAA+PROBE: ABNORMAL
SPECIMEN SOURCE: ABNORMAL

## 2021-01-03 NOTE — TELEPHONE ENCOUNTER
"Coronavirus (COVID-19) Notification    Caller Name (Patient, parent, daughter/son, grandparent, etc)  Patient    Reason for call  Notify of Positive Coronavirus (COVID-19) lab results, assess symptoms,  review [facility] recommendations    Lab Result    Lab test:  2019-nCoV rRt-PCR or SARS-CoV-2 PCR    Oropharyngeal AND/OR nasopharyngeal swabs is POSITIVE for 2019-nCoV RNA/SARS-COV-2 PCR (COVID-19 virus)    RN Recommendations/Instructions per [facility] Coronavirus COVID-19 recommendations    Brief introduction script  Introduce self then review script:  \"I am calling on behalf of [employer].  We were notified that your Coronavirus test (COVID-19) for was POSITIVE for the virus.  I have some information to relay to you but first I wanted to mention that the [place] Dept of Health will be contacting you shortly [it's possible MD already called Patient] to talk to you more about how you are feeling and other people you have had contact with who might now also have the virus.  Also, [facility] is Partnering with the [facility] for Covid-19 research, you may be contacted directly by research staff.\"    Assessment (Inquire about Patient's current symptoms)   Assessment   Current Symptoms at time of phone call: (if no symptoms, document No symptoms] Headache with irritated throat with mucus. My taste came back and my sinusitis and cough has improved.   Symptoms onset (if applicable) 12/21/2020     If at time of call, Patients symptoms hare worsened, the Patient should contact 911 or have someone drive them to Emergency Dept promptly:      If Patient calling 911, inform 911 personal that you have tested positive for the Coronavirus (COVID-19).  Place mask on and await 911 to arrive.    If Emergency Dept, If possible, please have another adult drive you to the Emergency Dept but you need to wear mask when in contact with other people.      Monoclonal Antibody Administration    You may be " "eligible to receive a new treatment with a monoclonal antibody for preventing hospitalization in patients at high risk for complications from COVID-19.   This medication is still experimental and available on a limited basis; it is given through an IV and must be given at an infusion center. Please note that not all people who are eligible will receive the medication since it is in limited supply.     Are you interested in being considered for this medication?  Yes Is the patient 18 years of age or older? Yes.  Does the patient use supplemental oxygen?  No.  Patient criteria for selection: None.  Patient does not qualify.   Does the patient fit the criteria: No    If patient qualifies based on above criteria:  \"We will contact you if you are selected to receive the medication in the next 1-2 days.   This is time sensitive and if you are not selected in the next 1-2 days, you will not receive the medication.  If you do not receive a call to schedule, you have not been selected.\"    Review information with Patient    Your result was positive. This means you have COVID-19 (coronavirus).  We have sent you a letter that reviews the information that I'll be reviewing with you now.    How can I protect others?    If you have symptoms: stay home and away from others (self-isolate) until:    You've had no fever--and no medicine that reduces fever--for 1 full day (24 hours). And       Your other symptoms have gotten better. For example, your cough or breathing has improved. And     At least 10 days have passed since your symptoms started. (If you've been told by a doctor that you have a weak immune system, wait 20 days.)     If you don't have symptoms: Stay home and away from others (self-isolate) until at least 10 days have passed since your first positive COVID-19 test. (Date test collected)    During this time:    Stay in your own room, including for meals. Use your own bathroom if you can.    Stay away from others in your " home. No hugging, kissing or shaking hands. No visitors.     Don't go to work, school or anywhere else.     Clean  high touch  surfaces often (doorknobs, counters, handles, etc.). Use a household cleaning spray or wipes. You'll find a full list on the EPA website at www.epa.gov/pesticide-registration/list-n-disinfectants-use-against-sars-cov-2.     Cover your mouth and nose with a mask, tissue or other face covering to avoid spreading germs.    Wash your hands and face often with soap and water.    Caregivers in these groups are at risk for severe illness due to COVID-19:  o People 65 years and older  o People who live in a nursing home or long-term care facility  o People with chronic disease (lung, heart, cancer, diabetes, kidney, liver, immunologic)  o People who have a weakened immune system, including those who:  - Are in cancer treatment  - Take medicine that weakens the immune system, such as corticosteroids  - Had a bone marrow or organ transplant  - Have an immune deficiency  - Have poorly controlled HIV or AIDS  - Are obese (body mass index of 40 or higher)  - Smoke regularly    Caregivers should wear gloves while washing dishes, handling laundry and cleaning bedrooms and bathrooms.    Wash and dry laundry with special caution. Don't shake dirty laundry, and use the warmest water setting you can.    If you have a weakened immune system, ask your doctor about other actions you should take.    For more tips, go to www.cdc.gov/coronavirus/2019-ncov/downloads/10Things.pdf.    You should not go back to work until you meet the guidelines above for ending your home isolation. You don't need to be retested for COVID-19 before going back to work--studies show that you won't spread the virus if it's been at least 10 days since your symptoms started (or 20 days, if you have a weak immune system).    Employers: This document serves as formal notice of your employee's medical guidelines for going back to work. They  must meet the above guidelines before going back to work in person.    How can I take care of myself?    1. Get lots of rest. Drink extra fluids (unless a doctor has told you not to).    2. Take Tylenol (acetaminophen) for fever or pain. If you have liver or kidney problems, ask your family doctor if it's okay to take Tylenol.     Take either:     650 mg (two 325 mg pills) every 4 to 6 hours, or     1,000 mg (two 500 mg pills) every 8 hours as needed.     Note: Don't take more than 3,000 mg in one day. Acetaminophen is found in many medicines (both prescribed and over-the-counter medicines). Read all labels to be sure you don't take too much.    For children, check the Tylenol bottle for the right dose (based on their age or weight).    3. If you have other health problems (like cancer, heart failure, an organ transplant or severe kidney disease): Call your specialty clinic if you don't feel better in the next 2 days.    4. Know when to call 911: Emergency warning signs include:    Trouble breathing or shortness of breath    Pain or pressure in the chest that doesn't go away    Feeling confused like you haven't felt before, or not being able to wake up    Bluish-colored lips or face    5. Sign up for [email or web address]. We know it's scary to hear that you have COVID-19. We want to track your symptoms to make sure you're okay over the next 2 weeks. Please look for an email from [email or web address]--this is a free, online program that we'll use to keep in touch. To sign up, follow the link in the email. Learn more at [email or web address].    Where can I get more information?    [place]: [email or web address]    Coronavirus Basics: www.health.state.[place]/diseases/coronavirus/basics.html    What to Do If You're Sick: www.cdc.gov/coronavirus/2019-ncov/about/steps-when-sick.html    Ending Home Isolation: www.cdc.gov/coronavirus/2019-ncov/hcp/disposition-in-home-patients.html     Caring for Someone with  COVID-19: www.cdc.gov/coronavirus/2019-ncov/if-you-are-sick/care-for-someone.html     [facility] clinical trials (COVID-19 research studies): [email or web address].[facility]-clinical-trials     A Positive COVID-19 letter will be sent via [employer] or the mail. (Exception, no letters sent to Presurgerical/Preprocedure Patients)    Patient did report I have a learning disability and I am very hard of hearing.  I do not use the computer nor do I understand this.  Patient did verbalize the understanding when to call 911 and when to contact the care line as this service is open 24 hours a day.   Marya Carranza LPN

## 2021-01-15 ENCOUNTER — HEALTH MAINTENANCE LETTER (OUTPATIENT)
Age: 66
End: 2021-01-15

## 2021-05-15 ENCOUNTER — HEALTH MAINTENANCE LETTER (OUTPATIENT)
Age: 66
End: 2021-05-15

## 2021-07-29 ENCOUNTER — HOSPITAL ENCOUNTER (OUTPATIENT)
Dept: MAMMOGRAPHY | Facility: CLINIC | Age: 66
Discharge: HOME | End: 2021-07-29
Attending: FAMILY MEDICINE | Admitting: FAMILY MEDICINE
Payer: COMMERCIAL

## 2021-07-29 DIAGNOSIS — Z12.31 OTHER SCREENING MAMMOGRAM: ICD-10-CM

## 2021-07-29 PROCEDURE — 77067 SCR MAMMO BI INCL CAD: CPT

## 2021-09-04 ENCOUNTER — HEALTH MAINTENANCE LETTER (OUTPATIENT)
Age: 66
End: 2021-09-04

## 2021-10-30 ENCOUNTER — HEALTH MAINTENANCE LETTER (OUTPATIENT)
Age: 66
End: 2021-10-30

## 2022-06-14 ENCOUNTER — OFFICE VISIT (OUTPATIENT)
Dept: URGENT CARE | Facility: URGENT CARE | Age: 67
End: 2022-06-14
Payer: COMMERCIAL

## 2022-06-14 VITALS — DIASTOLIC BLOOD PRESSURE: 83 MMHG | HEART RATE: 87 BPM | SYSTOLIC BLOOD PRESSURE: 157 MMHG | RESPIRATION RATE: 16 BRPM

## 2022-06-14 DIAGNOSIS — H69.92 DYSFUNCTION OF LEFT EUSTACHIAN TUBE: Primary | ICD-10-CM

## 2022-06-14 PROCEDURE — 99213 OFFICE O/P EST LOW 20 MIN: CPT

## 2022-06-15 NOTE — PROGRESS NOTES
Assessment & Plan     Dysfunction of left eustachian tube  Start using Flonase daily- Has this at home but has not been using.   Recheck with PCP or UC for increased pain, fever or drainage from the ear      15 minutes spent on the date of the encounter doing patient visit and documentation        See Patient Instructions    Return in about 1 week (around 6/21/2022), or if symptoms worsen or fail to improve.    [facility] Urgent Care Provider  [facility] ERICK Curry is a 66 year old who presents for the following health issues  accompanied by her spouse.    HPI     Laura is 66 years old and presents with left ear pain for the past 3 days.  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  Denies any fever, drainage from the ear, cough, runny nose or nasal congestion.  States she sometimes gets wax buildup in both ears.    Review of Systems   Constitutional, HEENT, cardiovascular, pulmonary, gi and gu systems are negative, except as otherwise noted.      Objective    There were no vitals taken for this visit.  There is no height or weight on file to calculate BMI.  Physical Exam   GENERAL: healthy, alert and no distress  EYES: Eyes grossly normal to inspection, PERRL and conjunctivae and sclerae normal  HENT: normal cephalic/atraumatic, right ear: normal: no effusions, no erythema, normal landmarks, left ear: clear effusion, nose and mouth without ulcers or lesions, oropharynx clear and oral mucous membranes moist  NECK: no adenopathy  RESP: lungs clear to auscultation - no rales, rhonchi or wheezes  CV: regular rates and rhythm, normal S1 S2, no S3 or S4 and no murmur, click or rub

## 2022-06-15 NOTE — PATIENT INSTRUCTIONS
Start using your Flonase daily to help dry up the fluid behind the left ear.   May use Tylenol for any pain.  See your regular doctor in 2 weeks if still having symptoms.  Return to urgent care for any fever or drainage from the ear.

## 2022-10-22 ENCOUNTER — HEALTH MAINTENANCE LETTER (OUTPATIENT)
Age: 67
End: 2022-10-22

## 2022-12-10 ENCOUNTER — HEALTH MAINTENANCE LETTER (OUTPATIENT)
Age: 67
End: 2022-12-10

## 2023-04-01 ENCOUNTER — OFFICE VISIT (OUTPATIENT)
Dept: URGENT CARE | Facility: URGENT CARE | Age: 68
End: 2023-04-01
Payer: COMMERCIAL

## 2023-04-01 VITALS
DIASTOLIC BLOOD PRESSURE: 92 MMHG | RESPIRATION RATE: 17 BRPM | TEMPERATURE: 98.3 F | BODY MASS INDEX: 24.27 KG/M2 | WEIGHT: 137 LBS | OXYGEN SATURATION: 97 % | HEART RATE: 96 BPM | SYSTOLIC BLOOD PRESSURE: 168 MMHG

## 2023-04-01 DIAGNOSIS — J06.9 VIRAL UPPER RESPIRATORY TRACT INFECTION WITH COUGH: Primary | ICD-10-CM

## 2023-04-01 LAB
DEPRECATED S PYO AG THROAT QL EIA: NEGATIVE
GROUP A STREP BY PCR: NOT DETECTED

## 2023-04-01 PROCEDURE — U0003 INFECTIOUS AGENT DETECTION BY NUCLEIC ACID (DNA OR RNA); SEVERE ACUTE RESPIRATORY SYNDROME CORONAVIRUS 2 (SARS-COV-2) (CORONAVIRUS DISEASE [COVID-19]), AMPLIFIED PROBE TECHNIQUE, MAKING USE OF HIGH THROUGHPUT TECHNOLOGIES AS DESCRIBED BY CMS-2020-01-R: HCPCS | Performed by: PHYSICIAN ASSISTANT

## 2023-04-01 PROCEDURE — U0005 INFEC AGEN DETEC AMPLI PROBE: HCPCS | Performed by: PHYSICIAN ASSISTANT

## 2023-04-01 PROCEDURE — 87651 STREP A DNA AMP PROBE: CPT | Performed by: PHYSICIAN ASSISTANT

## 2023-04-01 PROCEDURE — 99213 OFFICE O/P EST LOW 20 MIN: CPT | Mod: CS | Performed by: PHYSICIAN ASSISTANT

## 2023-04-01 RX ORDER — TRAVOPROST OPHTHALMIC SOLUTION 0.04 MG/ML
1 SOLUTION OPHTHALMIC AT BEDTIME
COMMUNITY

## 2023-04-01 RX ORDER — BENZONATATE 100 MG/1
100 CAPSULE ORAL 3 TIMES DAILY PRN
Qty: 30 CAPSULE | Refills: 0 | Status: SHIPPED | OUTPATIENT
Start: 2023-04-01 | End: 2023-04-08

## 2023-04-01 NOTE — PROGRESS NOTES
URGENT CARE VISIT:    SUBJECTIVE:   Antoinette Collazo is a 67 year old female presenting with a chief complaint of cough - productive and post nasal drainage.  Onset was 1 week(s) ago.   She denies the following symptoms: fever, chills, stuffy nose and sore throat  Course of illness is worsening.    Treatment measures tried include Flonase and warm steam with no relief of symptoms.  Predisposing factors include None.    PMH: History reviewed. No pertinent past medical history.  Allergies: Amoxicillin, Ancef [cefazolin], Ibuprofen, and Toradol [ketorolac tromethamine]   Medications:   Current Outpatient Medications   Medication Sig Dispense Refill     acetaminophen (TYLENOL) 325 MG tablet Take 2 tablets (650 mg) by mouth every 4 hours as needed for other (For optimal non-opioid multimodal pain management to improve pain control and physical function.) 30 tablet 0     benzonatate (TESSALON) 100 MG capsule Take 1 capsule (100 mg) by mouth 3 times daily as needed for cough 30 capsule 0     fluticasone (FLONASE) 50 MCG/ACT nasal spray Spray 1 spray into both nostrils daily 1 g 0     latanoprost (XALATAN) 0.005 % ophthalmic solution Place 1 drop into both eyes daily       travoprost BAK FREE (TRAVATAN Z) 0.004 % ophthalmic solution 1 drop At Bedtime       Social History:   Social History     Tobacco Use     Smoking status: Not on file     Smokeless tobacco: Not on file   Substance Use Topics     Alcohol use: Not on file       ROS:  Review of systems negative except as stated above.    OBJECTIVE:  BP (!) 168/92   Pulse 96   Temp 98.3  F (36.8  C)   Resp 17   Wt 62.1 kg (137 lb)   SpO2 97%   BMI 24.27 kg/m    GENERAL APPEARANCE: healthy, alert and no distress  EYES: EOMI,  PERRL, conjunctiva clear  HENT: ear canals and TM's normal.  Nose and mouth without ulcers, erythema or lesions  NECK: supple, nontender, no lymphadenopathy  RESP: lungs clear to auscultation - no rales, rhonchi or wheezes  CV: regular rates and  rhythm, normal S1 S2, no murmur noted  SKIN: no suspicious lesions or rashes    Labs:    Results for orders placed or performed in visit on 04/01/23   Streptococcus A Rapid Screen w/Reflex to PCR - Clinic Collect     Status: Normal    Specimen: Throat; Swab   Result Value Ref Range    Group A Strep antigen Negative Negative       ASSESSMENT:    ICD-10-CM    1. Viral upper respiratory tract infection with cough  J06.9 Streptococcus A Rapid Screen w/Reflex to PCR - Clinic Collect     Symptomatic COVID-19 Virus (Coronavirus) by PCR Nose     Group A Streptococcus PCR Throat Swab     benzonatate (TESSALON) 100 MG capsule          PLAN:  Patient Instructions   Patient was educated on the natural course of viral illness which typically lasts up to 10 days.  Strep and COVID PCR are pending. Conservative measures discussed including increased fluids, nasal saline irrigation (neti pot), warm steamy shower, salt water gargles, expectorants (Mucinex), and analgesics (Tylenol). Continue using Flonase. See your primary care provider if symptoms worsen or do not improve in 7 days. Seek emergency care if you develop fever over 104 or shortness of breath.  Patient verbalized understanding and is agreeable to plan. The patient was discharged ambulatory and in stable condition.    Laureen Albarado PA-C ....................  4/1/2023   11:59 AM

## 2023-04-01 NOTE — PATIENT INSTRUCTIONS
Patient was educated on the natural course of viral illness which typically lasts up to 10 days.  Conservative measures discussed including increased fluids, nasal saline irrigation (neti pot), warm steamy shower, salt water gargles, expectorants (Mucinex), and analgesics (Tylenol). Continue using Flonase. See your primary care provider if symptoms worsen or do not improve in 7 days. Seek emergency care if you develop fever over 104 or shortness of breath.

## 2023-04-02 LAB — SARS-COV-2 RNA RESP QL NAA+PROBE: NEGATIVE

## 2023-11-05 ENCOUNTER — HEALTH MAINTENANCE LETTER (OUTPATIENT)
Age: 68
End: 2023-11-05

## 2023-12-27 ENCOUNTER — HOSPITAL ENCOUNTER (EMERGENCY)
Facility: CLINIC | Age: 68
Discharge: HOME | End: 2023-12-28
Attending: EMERGENCY MEDICINE | Admitting: EMERGENCY MEDICINE
Payer: COMMERCIAL

## 2023-12-27 ENCOUNTER — APPOINTMENT (OUTPATIENT)
Dept: MRI IMAGING | Facility: CLINIC | Age: 68
End: 2023-12-27
Attending: EMERGENCY MEDICINE
Payer: COMMERCIAL

## 2023-12-27 ENCOUNTER — APPOINTMENT (OUTPATIENT)
Dept: GENERAL RADIOLOGY | Facility: CLINIC | Age: 68
End: 2023-12-27
Attending: EMERGENCY MEDICINE
Payer: COMMERCIAL

## 2023-12-27 VITALS
SYSTOLIC BLOOD PRESSURE: 151 MMHG | DIASTOLIC BLOOD PRESSURE: 92 MMHG | OXYGEN SATURATION: 97 % | RESPIRATION RATE: 16 BRPM | TEMPERATURE: 98.6 F | HEART RATE: 68 BPM

## 2023-12-27 DIAGNOSIS — S29.019A THORACIC MYOFASCIAL STRAIN, INITIAL ENCOUNTER: ICD-10-CM

## 2023-12-27 DIAGNOSIS — M54.16 LUMBAR RADICULOPATHY: ICD-10-CM

## 2023-12-27 DIAGNOSIS — E04.1 THYROID NODULE: ICD-10-CM

## 2023-12-27 LAB
ALBUMIN SERPL BCG-MCNC: 4.5 G/DL (ref 3.5–5.2)
ALBUMIN UR-MCNC: NEGATIVE MG/DL
ALP SERPL-CCNC: 109 U/L (ref 40–150)
ALT SERPL W P-5'-P-CCNC: 27 U/L (ref 0–50)
AMORPH CRY #/AREA URNS HPF: ABNORMAL /HPF
ANION GAP SERPL CALCULATED.3IONS-SCNC: 9 MMOL/L (ref 7–15)
APPEARANCE UR: CLEAR
AST SERPL W P-5'-P-CCNC: 26 U/L (ref 0–45)
BASOPHILS # BLD AUTO: 0.1 10E3/UL (ref 0–0.2)
BASOPHILS NFR BLD AUTO: 1 %
BILIRUB SERPL-MCNC: 0.4 MG/DL
BILIRUB UR QL STRIP: NEGATIVE
BUN SERPL-MCNC: 12.1 MG/DL (ref 8–23)
CALCIUM SERPL-MCNC: 9.7 MG/DL (ref 8.8–10.2)
CHLORIDE SERPL-SCNC: 103 MMOL/L (ref 98–107)
COLOR UR AUTO: ABNORMAL
CREAT SERPL-MCNC: 0.63 MG/DL (ref 0.51–0.95)
DEPRECATED HCO3 PLAS-SCNC: 28 MMOL/L (ref 22–29)
EGFRCR SERPLBLD CKD-EPI 2021: >90 ML/MIN/1.73M2
EOSINOPHIL # BLD AUTO: 0.1 10E3/UL (ref 0–0.7)
EOSINOPHIL NFR BLD AUTO: 1 %
ERYTHROCYTE [DISTWIDTH] IN BLOOD BY AUTOMATED COUNT: 11.9 % (ref 10–15)
GLUCOSE SERPL-MCNC: 107 MG/DL (ref 70–99)
GLUCOSE UR STRIP-MCNC: NEGATIVE MG/DL
HCT VFR BLD AUTO: 44.5 % (ref 35–47)
HGB BLD-MCNC: 14.9 G/DL (ref 11.7–15.7)
HGB UR QL STRIP: ABNORMAL
IMM GRANULOCYTES # BLD: 0 10E3/UL
IMM GRANULOCYTES NFR BLD: 0 %
KETONES UR STRIP-MCNC: NEGATIVE MG/DL
LEUKOCYTE ESTERASE UR QL STRIP: ABNORMAL
LYMPHOCYTES # BLD AUTO: 2.5 10E3/UL (ref 0.8–5.3)
LYMPHOCYTES NFR BLD AUTO: 30 %
MCH RBC QN AUTO: 30.5 PG (ref 26.5–33)
MCHC RBC AUTO-ENTMCNC: 33.5 G/DL (ref 31.5–36.5)
MCV RBC AUTO: 91 FL (ref 78–100)
MONOCYTES # BLD AUTO: 0.5 10E3/UL (ref 0–1.3)
MONOCYTES NFR BLD AUTO: 6 %
MUCOUS THREADS #/AREA URNS LPF: PRESENT /LPF
NEUTROPHILS # BLD AUTO: 5.3 10E3/UL (ref 1.6–8.3)
NEUTROPHILS NFR BLD AUTO: 62 %
NITRATE UR QL: NEGATIVE
NRBC # BLD AUTO: 0 10E3/UL
NRBC BLD AUTO-RTO: 0 /100
PH UR STRIP: 6.5 [PH] (ref 5–7)
PLATELET # BLD AUTO: 319 10E3/UL (ref 150–450)
POTASSIUM SERPL-SCNC: 4.3 MMOL/L (ref 3.4–5.3)
PROT SERPL-MCNC: 7.5 G/DL (ref 6.4–8.3)
RBC # BLD AUTO: 4.88 10E6/UL (ref 3.8–5.2)
RBC URINE: 4 /HPF
SODIUM SERPL-SCNC: 140 MMOL/L (ref 135–145)
SP GR UR STRIP: 1.02 (ref 1–1.03)
SQUAMOUS EPITHELIAL: <1 /HPF
UROBILINOGEN UR STRIP-MCNC: NORMAL MG/DL
WBC # BLD AUTO: 8.4 10E3/UL (ref 4–11)
WBC URINE: 8 /HPF

## 2023-12-27 PROCEDURE — 81001 URINALYSIS AUTO W/SCOPE: CPT | Performed by: EMERGENCY MEDICINE

## 2023-12-27 PROCEDURE — 93005 ELECTROCARDIOGRAM TRACING: CPT

## 2023-12-27 PROCEDURE — 72146 MRI CHEST SPINE W/O DYE: CPT

## 2023-12-27 PROCEDURE — 250N000013 HC RX MED GY IP 250 OP 250 PS 637: Performed by: EMERGENCY MEDICINE

## 2023-12-27 PROCEDURE — 85025 COMPLETE CBC W/AUTO DIFF WBC: CPT | Performed by: EMERGENCY MEDICINE

## 2023-12-27 PROCEDURE — 87086 URINE CULTURE/COLONY COUNT: CPT | Performed by: EMERGENCY MEDICINE

## 2023-12-27 PROCEDURE — 96374 THER/PROPH/DIAG INJ IV PUSH: CPT

## 2023-12-27 PROCEDURE — 250N000011 HC RX IP 250 OP 636: Performed by: EMERGENCY MEDICINE

## 2023-12-27 PROCEDURE — 71046 X-RAY EXAM CHEST 2 VIEWS: CPT

## 2023-12-27 PROCEDURE — 36415 COLL VENOUS BLD VENIPUNCTURE: CPT | Performed by: EMERGENCY MEDICINE

## 2023-12-27 PROCEDURE — 80053 COMPREHEN METABOLIC PANEL: CPT | Performed by: EMERGENCY MEDICINE

## 2023-12-27 PROCEDURE — 99285 EMERGENCY DEPT VISIT HI MDM: CPT | Mod: 25

## 2023-12-27 PROCEDURE — 72148 MRI LUMBAR SPINE W/O DYE: CPT

## 2023-12-27 RX ORDER — OXYCODONE HYDROCHLORIDE 5 MG/1
5 TABLET ORAL ONCE
Status: COMPLETED | OUTPATIENT
Start: 2023-12-27 | End: 2023-12-27

## 2023-12-27 RX ORDER — HYDROCODONE BITARTRATE AND ACETAMINOPHEN 5; 325 MG/1; MG/1
1 TABLET ORAL EVERY 6 HOURS PRN
Qty: 10 TABLET | Refills: 0 | Status: SHIPPED | OUTPATIENT
Start: 2023-12-27

## 2023-12-27 RX ORDER — KETOROLAC TROMETHAMINE 15 MG/ML
10 INJECTION, SOLUTION INTRAMUSCULAR; INTRAVENOUS ONCE
Status: COMPLETED | OUTPATIENT
Start: 2023-12-27 | End: 2023-12-27

## 2023-12-27 RX ORDER — OXYCODONE HYDROCHLORIDE 5 MG/1
5 TABLET ORAL ONCE
Status: DISCONTINUED | OUTPATIENT
Start: 2023-12-27 | End: 2023-12-27

## 2023-12-27 RX ADMIN — OXYCODONE HYDROCHLORIDE 5 MG: 5 TABLET ORAL at 22:24

## 2023-12-27 RX ADMIN — KETOROLAC TROMETHAMINE 10 MG: 15 INJECTION, SOLUTION INTRAMUSCULAR; INTRAVENOUS at 18:38

## 2023-12-27 ASSESSMENT — ACTIVITIES OF DAILY LIVING (ADL)
ADLS_ACUITY_SCORE: 35

## 2023-12-28 LAB
ATRIAL RATE - MUSE: 66 BPM
DIASTOLIC BLOOD PRESSURE - MUSE: NORMAL MMHG
INTERPRETATION ECG - MUSE: NORMAL
P AXIS - MUSE: 45 DEGREES
PR INTERVAL - MUSE: 176 MS
QRS DURATION - MUSE: 84 MS
QT - MUSE: 432 MS
QTC - MUSE: 452 MS
R AXIS - MUSE: 27 DEGREES
SYSTOLIC BLOOD PRESSURE - MUSE: NORMAL MMHG
T AXIS - MUSE: 30 DEGREES
VENTRICULAR RATE- MUSE: 66 BPM

## 2023-12-28 NOTE — ED PROVIDER NOTES
Rapid Assessment Note    History:   Antoinette Collazo is a 68 year old female who presents with as a referral from the orthopedic clinic.  On December 11 she was bending over.  She felt the sudden pain in the thoracolumbar junction area.  At times the pain radiates around into her ribs.  She was seen by the orthopedic clinic and was advised to come to the ED as they did not feel it was necessarily a fracture.  She at times has pain that radiates into the thighs bilaterally.  She denies numbness or weakness of the feet or hands.  The patient acknowledges that she has difficulty exactly describing her symptoms.  She says is not necessarily a loss of sensation but at times she experiences that it is a hyperesthesia.  The patient has had known lumbar radiculopathy and believes that within the last several months she had an epidural injection for the right L5 or S1 nerve root.  She denies any trauma to the back.  The patient says that she has had episodes where she feels that she has lost continence of her urine.  However, she has had chronic bladder spasm.    Exam:   General:  Alert, interactive  Cardiovascular:  Well perfused  Lungs:  No respiratory distress, no accessory muscle use  Neuro: Gait is normal.  Speech is fluent.  No facial asymmetry.   strength 5 out of 5 ankle bilaterally and hands.  Strength 5 out of 5 nuchal bilaterally in the lower extremities.  Sensation light touch intact and symmetric over the arms and legs.  Musculoskeletal: No midline tenderness in the thoracic or lumbar spine.  No cervical tenderness.  Skin:  Warm, dry  Psych:  Normal affect      Plan of Care:   I evaluated the patient and developed an initial plan of care. I discussed this plan and explained that I, or one of my partners, would be returning to complete the evaluation.           Manan Lofton MD  12/27/23 [id number]

## 2023-12-28 NOTE — DISCHARGE INSTRUCTIONS
Do not drive, use machinery, use alcohol, use other sedating medications, or reason medication also contains acetaminophen (Tylenol) while taking Norco.    Return to the ER for worsening pain, numbness or weakness of the legs, or any new concerns.    You should follow-up with your orthopedic clinic in the next week for further evaluation.  Please call tomorrow to schedule an appointment.    Your testing incidentally also shows that you have a nodule in the thyroid gland.  This does not appear to be cancerous.  However, you should arrange further follow-up through primary care clinic within the next 3 months to make sure that this nodule is not increasing in size.

## 2023-12-28 NOTE — ED PROVIDER NOTES
History     Chief Complaint:  Back Pain       HPI   Antoinette Collazo is a 68 year old female who presents with as a referral from the orthopedic clinic.  On December 11 she was bending over.  She felt the sudden pain in the thoracolumbar junction area.  At times the pain radiates around into her ribs.  She was seen by the orthopedic clinic and was advised to come to the ED as they did not feel it was necessarily a fracture.  She at times has pain that radiates into the thighs bilaterally.  She denies numbness or weakness of the feet or hands.  The patient acknowledges that she has difficulty exactly describing her symptoms.  She says is not necessarily a loss of sensation but at times she experiences that it is a hyperesthesia.  The patient has had known lumbar radiculopathy and believes that within the last several months she had an epidural injection for the right L5 or S1 nerve root.  She denies any trauma to the back.  The patient says that she has had episodes where she feels that she has lost continence of her urine.  However, she has had chronic bladder spasm.  She does not feel that this is changed.      Independent Historian:    The patient's  is present and provides additional history in regards to her prior evaluation through orthopedics as well as the progression of her current symptoms.    Review of External Notes:  Office visit reviewed from April 1, 2023 when the patient was seen for URI.    Medications:    HYDROcodone-acetaminophen (NORCO) 5-325 MG tablet  acetaminophen (TYLENOL) 325 MG tablet  fluticasone (FLONASE) 50 MCG/ACT nasal spray  latanoprost (XALATAN) 0.005 % ophthalmic solution  travoprost RADHA FREE (TRAVATAN Z) 0.004 % ophthalmic solution        Past Medical History:    No past medical history on file.    Past Surgical History:    Past Surgical History:   Procedure Laterality Date    ARTHROPLASTY HIP Right 12/29/2019    Procedure: ARTHROPLASTY, RIGHT HIP, TOTAL. Min lazo  Emiliano.;  Surgeon: Chace Brito MD;  Location:  OR          Physical Exam   Patient Vitals for the past 24 hrs:   BP Temp Temp src Pulse Resp SpO2   12/27/23 2343 (!) 151/92 -- -- 68 -- 97 %   12/27/23 2340 -- -- -- -- -- 95 %   12/27/23 2232 -- -- -- -- -- 97 %   12/27/23 2224 (!) 183/87 -- -- 68 -- --   12/27/23 1722 (!) 179/91 98.6  F (37  C) Oral 63 16 98 %        Physical Exam  Constitutional:       General: She is not in acute distress.     Appearance: Normal appearance. She is not diaphoretic.   HENT:      Head: Atraumatic.      Mouth/Throat:      Mouth: Mucous membranes are moist.   Eyes:      General: No scleral icterus.     Conjunctiva/sclera: Conjunctivae normal.   Cardiovascular:      Rate and Rhythm: Normal rate and regular rhythm.      Heart sounds: Normal heart sounds.   Pulmonary:      Effort: No respiratory distress.      Breath sounds: Normal breath sounds.   Abdominal:      General: Abdomen is flat. There is no distension.      Tenderness: There is no abdominal tenderness.   Musculoskeletal:      Cervical back: Neck supple.      Comments: No midline tenderness in the thoracic or lumbar spine.  Full range of motion of the back although this does elicit pain.   Skin:     General: Skin is warm.      Capillary Refill: Capillary refill takes less than 2 seconds.      Findings: No rash.   Neurological:      General: No focal deficit present.      Mental Status: She is alert and oriented to person, place, and time.      Comments: Speech is fluent.  Gait is normal.  No facial asymmetry.  Strength 5 out of 5 and equal bilaterally in the upper and lower extremities.  Sensation light touch intact over the arms and legs.   Psychiatric:         Mood and Affect: Mood normal.         Behavior: Behavior normal.           Emergency Department Course   ECG  ECG results from 12/27/23   EKG 12-lead, tracing only     Value    Systolic Blood Pressure     Diastolic Blood Pressure     Ventricular Rate 66    Atrial  Rate 66    MS Interval 176    QRS Duration 84        QTc 452    P Axis 45    R AXIS 27    T Axis 30    Interpretation ECG      Sinus rhythm  Low voltage QRS  Cannot rule out Anterior infarct , age undetermined  Abnormal ECG  No previous ECGs available  Confirmed by GENERATED REPORT, COMPUTER ([id number]),  Suzi Brown ([id number]) on 12/28/2023 12:26:27 AM        Imaging:  XR Chest 2 Views   Final Result   IMPRESSION: Negative chest.      Lumbar spine MRI w/o contrast   Final Result   IMPRESSION:      THORACIC SPINE MRI:   1.  Mild thoracic spondylosis as detailed, with mild Modic type I endplate change anteriorly at T5-T6.   2.  No spinal canal stenosis or spinal cord signal abnormality.   3.  Mild to moderate foraminal stenoses left T11-T12. No high-grade foraminal stenosis.   4.  Left thyroid nodule measuring 1.6 cm, recommend thyroid ultrasound for further characterization.      LUMBAR SPINE MRI:   1.  Moderate lumbar spondylosis as detailed with mild Modic type I endplate changes L4-L5 and L5-S1.   2.  Moderate spinal canal stenosis at L4-L5 with severe lateral recess stenoses and moderate right foraminal stenosis.   3.  Moderate spinal canal stenosis at L3-L4 with preferential left lateral recess stenosis and moderate left foraminal stenosis.   4.  Moderate bilateral foraminal stenoses at L5-S1.      Thoracic spine MRI w/o contrast   Final Result   IMPRESSION:      THORACIC SPINE MRI:   1.  Mild thoracic spondylosis as detailed, with mild Modic type I endplate change anteriorly at T5-T6.   2.  No spinal canal stenosis or spinal cord signal abnormality.   3.  Mild to moderate foraminal stenoses left T11-T12. No high-grade foraminal stenosis.   4.  Left thyroid nodule measuring 1.6 cm, recommend thyroid ultrasound for further characterization.      LUMBAR SPINE MRI:   1.  Moderate lumbar spondylosis as detailed with mild Modic type I endplate changes L4-L5 and L5-S1.   2.  Moderate spinal canal stenosis at  L4-L5 with severe lateral recess stenoses and moderate right foraminal stenosis.   3.  Moderate spinal canal stenosis at L3-L4 with preferential left lateral recess stenosis and moderate left foraminal stenosis.   4.  Moderate bilateral foraminal stenoses at L5-S1.        Report per radiology    Laboratory:  Labs Ordered and Resulted from Time of ED Arrival to Time of ED Departure   COMPREHENSIVE METABOLIC PANEL - Abnormal       Result Value    Sodium 140      Potassium 4.3      Carbon Dioxide (CO2) 28      Anion Gap 9      Urea Nitrogen 12.1      Creatinine 0.63      GFR Estimate >90      Calcium 9.7      Chloride 103      Glucose 107 (*)     Alkaline Phosphatase 109      AST 26      ALT 27      Protein Total 7.5      Albumin 4.5      Bilirubin Total 0.4     ROUTINE UA WITH MICROSCOPIC REFLEX TO CULTURE - Abnormal    Color Urine Light Yellow      Appearance Urine Clear      Glucose Urine Negative      Bilirubin Urine Negative      Ketones Urine Negative      Specific Gravity Urine 1.020      Blood Urine Small (*)     pH Urine 6.5      Protein Albumin Urine Negative      Urobilinogen Urine Normal      Nitrite Urine Negative      Leukocyte Esterase Urine Moderate (*)     Mucus Urine Present (*)     Amorphous Crystals Urine Few (*)     RBC Urine 4 (*)     WBC Urine 8 (*)     Squamous Epithelials Urine <1     CBC WITH PLATELETS AND DIFFERENTIAL    WBC Count 8.4      RBC Count 4.88      Hemoglobin 14.9      Hematocrit 44.5      MCV 91      MCH 30.5      MCHC 33.5      RDW 11.9      Platelet Count 319      % Neutrophils 62      % Lymphocytes 30      % Monocytes 6      % Eosinophils 1      % Basophils 1      % Immature Granulocytes 0      NRBCs per 100 WBC 0      Absolute Neutrophils 5.3      Absolute Lymphocytes 2.5      Absolute Monocytes 0.5      Absolute Eosinophils 0.1      Absolute Basophils 0.1      Absolute Immature Granulocytes 0.0      Absolute NRBCs 0.0     URINE CULTURE        Emergency Department Course &  Assessments:             Interventions:  Medications   ketorolac (TORADOL) injection 10 mg (10 mg Intravenous $Given 12/27/23 1838)   oxyCODONE (ROXICODONE) tablet 5 mg (5 mg Oral $Given 12/27/23 2224)        Independent Interpretation (X-rays, CTs, rhythm strip):  Chest x-ray independently interpreted.  No infiltrate.     Disposition:  The patient was discharged to home.     Impression & Plan        Medical Decision Making:  This patient is a 68-year-old who presents with back pain.  It is diffusely across her upper back.  She feels it somewhat in the lumbar area as well.  She has had prior epidural injections of the lumbar area due to radiculopathy.  Given the questionable loss of bladder continence the patient had an MRI.  She has multiple levels of degenerative changes but no significant central canal stenosis.  The patient will continue to follow through her outpatient orthopedist.  She may require another epidural injection.  She does not have any neurologic deficit on my exam.  The remainder of her workup overall is unremarkable.  The patient and her  feel comfortable this plan.  She will have Norco for pain.    The patient was informed of the thyroid nodule and advised to follow this up further through the primary clinic.        Diagnosis:    ICD-10-CM    1. Thoracic myofascial strain, initial encounter  S29.019A       2. Lumbar radiculopathy  M54.16       3. Thyroid nodule  E04.1            Discharge Medications:  Discharge Medication List as of 12/27/2023 11:40 PM        START taking these medications    Details   HYDROcodone-acetaminophen (NORCO) 5-325 MG tablet Take 1 tablet by mouth every 6 hours as needed for pain, Disp-10 tablet, R-0, E-Prescribe                  12/27/2023   Manan Lofton MD McRoberts, Sean Edward, MD  12/28/23 0157

## 2023-12-28 NOTE — ED TRIAGE NOTES
Pt injured left side of body and has back pain that wraps around to front ribs. Pain will also have pain radiating into thighs.  Pt. States she is incontinent at times.      Triage Assessment (Adult)       Row Name 12/27/23 1701          Triage Assessment    Airway WDL WDL  Speaks in full sentences        Cognitive/Neuro/Behavioral WDL    Level of Consciousness alert     Arousal Level opens eyes spontaneously     Orientation oriented x 4     Speech logical

## 2023-12-29 LAB — BACTERIA UR CULT: NORMAL

## 2024-01-14 ENCOUNTER — HEALTH MAINTENANCE LETTER (OUTPATIENT)
Age: 69
End: 2024-01-14

## 2024-05-29 ENCOUNTER — HOSPITAL ENCOUNTER (OUTPATIENT)
Dept: ULTRASOUND IMAGING | Facility: CLINIC | Age: 69
Discharge: HOME | End: 2024-05-29
Attending: INTERNAL MEDICINE | Admitting: INTERNAL MEDICINE
Payer: MEDICARE

## 2024-05-29 DIAGNOSIS — E04.1 THYROID NODULE: ICD-10-CM

## 2024-05-29 PROCEDURE — 250N000009 HC RX 250: Performed by: RADIOLOGY

## 2024-05-29 PROCEDURE — 88173 CYTOPATH EVAL FNA REPORT: CPT | Mod: 26 | Performed by: PATHOLOGY

## 2024-05-29 PROCEDURE — 272N000431 US BIOPSY THYROID FINE NEEDLE ASPIRATION

## 2024-05-29 PROCEDURE — 88173 CYTOPATH EVAL FNA REPORT: CPT | Mod: TC | Performed by: INTERNAL MEDICINE

## 2024-05-29 RX ORDER — LIDOCAINE HYDROCHLORIDE 10 MG/ML
5 INJECTION, SOLUTION EPIDURAL; INFILTRATION; INTRACAUDAL; PERINEURAL ONCE
Status: COMPLETED | OUTPATIENT
Start: 2024-05-29 | End: 2024-05-29

## 2024-05-29 RX ADMIN — LIDOCAINE HYDROCHLORIDE 5 ML: 10 INJECTION, SOLUTION EPIDURAL; INFILTRATION; INTRACAUDAL; PERINEURAL at 14:26

## 2024-05-31 LAB
PATH REPORT.COMMENTS IMP SPEC: NORMAL
PATH REPORT.COMMENTS IMP SPEC: NORMAL
PATH REPORT.FINAL DX SPEC: NORMAL
PATH REPORT.GROSS SPEC: NORMAL
PATH REPORT.MICROSCOPIC SPEC OTHER STN: NORMAL

## 2025-01-26 ENCOUNTER — HEALTH MAINTENANCE LETTER (OUTPATIENT)
Age: 70
End: 2025-01-26

## (undated) DEVICE — GLOVE PROTEXIS BLUE W/NEU-THERA 6.5  2D73EB65

## (undated) DEVICE — SU VICRYL 1 CTX CR 8X18" J765D

## (undated) DEVICE — SUCTION IRR SYSTEM W/O TIP INTERPULSE HANDPIECE 0210-100-000

## (undated) DEVICE — SOLUTION WOUND CLEANSING 3/4OZ 10% PVP EA-L3011FB-50

## (undated) DEVICE — SOL WATER IRRIG 1000ML BOTTLE 2F7114

## (undated) DEVICE — SU FIBERWIRE 5 CCS-1 BLUE  AR-7211

## (undated) DEVICE — PACK TOTAL HIP W/POUCH SOP15HPFSM

## (undated) DEVICE — GLOVE PROTEXIS BLUE W/NEU-THERA 8.5  2D73EB85

## (undated) DEVICE — GLOVE PROTEXIS POWDER FREE 8.5 ORTHOPEDIC 2D73ET85

## (undated) DEVICE — SU STRATAFIX PDS PLUS 2-0 SPIRAL CT-1 30CM SXPP1B410

## (undated) DEVICE — SPONGE LAP 18X18" X8435

## (undated) DEVICE — GLOVE PROTEXIS BLUE W/NEU-THERA 7.0  2D73EB70

## (undated) DEVICE — DRAPE CONVERTORS U-DRAPE 60X72" 8476

## (undated) DEVICE — DRSG STERI STRIP 1/2X4" R1547

## (undated) DEVICE — DRSG AQUACEL AG 3.5X6.0" HYDROFIBER 412010

## (undated) DEVICE — BONE CLEANING TIP INTERPULSE FEMORAL CANAL 0210-008-000

## (undated) DEVICE — SU ETHIBOND 2 V-37 4X30" MX69G

## (undated) DEVICE — BONE CEMENT MIXEVAC HI VAC W/CARTRIDGE 0306-563-000

## (undated) DEVICE — SOL NACL 0.9% IRRIG 1000ML BOTTLE 2F7124

## (undated) DEVICE — IMM PILLOW ABDUCT HIP MED 31143061

## (undated) DEVICE — SOL BENZOIN 0.5OZ

## (undated) DEVICE — PIN STEINMAN 1/8"

## (undated) DEVICE — SU FIBERWIRE 2 38"  AR-7200

## (undated) DEVICE — DRILL BIT FLEXIBLE REFLECTION 25MM  71362925

## (undated) DEVICE — GLOVE PROTEXIS POWDER FREE 6.5 ORTHOPEDIC 2D73ET65

## (undated) DEVICE — PAD FOAM MCGUIRE KIT 0814-0150

## (undated) DEVICE — ESU GROUND PAD UNIVERSAL W/O CORD

## (undated) DEVICE — LINEN TOWEL PACK X5 5464

## (undated) DEVICE — BLADE SAW SAGITTAL STRK 25X79.5X1.24MM 4/2000 2108-318-000

## (undated) DEVICE — DRAIN ROUND W/RESERV KIT JACKSON PRATT 10FR 400ML SU130-402D

## (undated) DEVICE — MANIFOLD NEPTUNE 4 PORT 700-20

## (undated) DEVICE — DRAPE IOBAN INCISE 23X17" 6650EZ

## (undated) DEVICE — PREP CHLORAPREP 26ML TINTED ORANGE  260815

## (undated) RX ORDER — ACETAMINOPHEN 325 MG/1
TABLET ORAL
Status: DISPENSED
Start: 2019-12-29

## (undated) RX ORDER — HYDROMORPHONE HYDROCHLORIDE 1 MG/ML
INJECTION, SOLUTION INTRAMUSCULAR; INTRAVENOUS; SUBCUTANEOUS
Status: DISPENSED
Start: 2019-12-29

## (undated) RX ORDER — ACYCLOVIR 200 MG/1
CAPSULE ORAL
Status: DISPENSED
Start: 2019-12-29

## (undated) RX ORDER — CLINDAMYCIN PHOSPHATE 900 MG/50ML
INJECTION, SOLUTION INTRAVENOUS
Status: DISPENSED
Start: 2019-12-29

## (undated) RX ORDER — OXYCODONE HCL 10 MG/1
TABLET, FILM COATED, EXTENDED RELEASE ORAL
Status: DISPENSED
Start: 2019-12-29

## (undated) RX ORDER — GLYCOPYRROLATE 0.2 MG/ML
INJECTION, SOLUTION INTRAMUSCULAR; INTRAVENOUS
Status: DISPENSED
Start: 2019-12-29

## (undated) RX ORDER — PROPOFOL 10 MG/ML
INJECTION, EMULSION INTRAVENOUS
Status: DISPENSED
Start: 2019-12-29

## (undated) RX ORDER — ROPIVACAINE HYDROCHLORIDE 2 MG/ML
INJECTION, SOLUTION EPIDURAL; INFILTRATION; PERINEURAL
Status: DISPENSED
Start: 2019-12-29

## (undated) RX ORDER — DEXAMETHASONE SODIUM PHOSPHATE 4 MG/ML
INJECTION, SOLUTION INTRA-ARTICULAR; INTRALESIONAL; INTRAMUSCULAR; INTRAVENOUS; SOFT TISSUE
Status: DISPENSED
Start: 2019-12-29

## (undated) RX ORDER — FENTANYL CITRATE 50 UG/ML
INJECTION, SOLUTION INTRAMUSCULAR; INTRAVENOUS
Status: DISPENSED
Start: 2019-12-29

## (undated) RX ORDER — ONDANSETRON 2 MG/ML
INJECTION INTRAMUSCULAR; INTRAVENOUS
Status: DISPENSED
Start: 2019-12-29

## (undated) RX ORDER — LIDOCAINE HYDROCHLORIDE 20 MG/ML
INJECTION, SOLUTION EPIDURAL; INFILTRATION; INTRACAUDAL; PERINEURAL
Status: DISPENSED
Start: 2019-12-29

## (undated) RX ORDER — KETOROLAC TROMETHAMINE 30 MG/ML
INJECTION, SOLUTION INTRAMUSCULAR; INTRAVENOUS
Status: DISPENSED
Start: 2019-12-29

## (undated) RX ORDER — PROPOFOL 10 MG/ML
INJECTION, EMULSION INTRAVENOUS
Status: DISPENSED
Start: 2020-01-01